# Patient Record
Sex: MALE | Race: WHITE
[De-identification: names, ages, dates, MRNs, and addresses within clinical notes are randomized per-mention and may not be internally consistent; named-entity substitution may affect disease eponyms.]

---

## 2019-08-13 ENCOUNTER — HOSPITAL ENCOUNTER (OUTPATIENT)
Dept: HOSPITAL 55 - SURG | Age: 84
Discharge: HOME | DRG: 125 | End: 2019-08-13
Attending: OPHTHALMOLOGY
Payer: MEDICARE

## 2019-08-13 VITALS — DIASTOLIC BLOOD PRESSURE: 97 MMHG | HEART RATE: 52 BPM | SYSTOLIC BLOOD PRESSURE: 168 MMHG

## 2019-08-13 VITALS — TEMPERATURE: 97.4 F | OXYGEN SATURATION: 95 % | OXYGEN SATURATION: 95 % | RESPIRATION RATE: 20 BRPM

## 2019-08-13 DIAGNOSIS — H25.89: Primary | ICD-10-CM

## 2019-08-13 DIAGNOSIS — E11.9: ICD-10-CM

## 2019-08-13 RX ADMIN — LIDOCAINE HYDROCHLORIDE ONE ML: 20 INJECTION, SOLUTION EPIDURAL; INFILTRATION; INTRACAUDAL; PERINEURAL at 14:25

## 2019-08-13 RX ADMIN — TETRACAINE HYDROCHLORIDE ONE DROP: 5 SOLUTION OPHTHALMIC at 14:25

## 2019-08-13 RX ADMIN — CYCLOPENTOLATE HYDROCHLORIDE ONE DROP: 10 SOLUTION OPHTHALMIC at 13:20

## 2019-08-13 RX ADMIN — PHENYLEPHRINE HYDROCHLORIDE ONE DROP: 100 SOLUTION/ DROPS OPHTHALMIC at 13:23

## 2019-08-13 RX ADMIN — LIDOCAINE HYDROCHLORIDE ONE ML: 20 INJECTION, SOLUTION EPIDURAL; INFILTRATION; INTRACAUDAL; PERINEURAL at 14:30

## 2019-08-13 RX ADMIN — TROPICAMIDE ONE DROP: 10 SOLUTION/ DROPS OPHTHALMIC at 13:23

## 2019-08-13 RX ADMIN — PHENYLEPHRINE HYDROCHLORIDE ONE DROP: 100 SOLUTION/ DROPS OPHTHALMIC at 13:17

## 2019-08-13 RX ADMIN — TROPICAMIDE ONE DROP: 10 SOLUTION/ DROPS OPHTHALMIC at 13:17

## 2019-08-13 RX ADMIN — TETRACAINE HYDROCHLORIDE ONE DROP: 5 SOLUTION OPHTHALMIC at 13:24

## 2019-08-13 RX ADMIN — TROPICAMIDE ONE DROP: 10 SOLUTION/ DROPS OPHTHALMIC at 13:20

## 2019-08-13 RX ADMIN — CYCLOPENTOLATE HYDROCHLORIDE ONE DROP: 10 SOLUTION OPHTHALMIC at 13:23

## 2019-08-13 RX ADMIN — PHENYLEPHRINE HYDROCHLORIDE ONE DROP: 100 SOLUTION/ DROPS OPHTHALMIC at 13:20

## 2019-08-13 RX ADMIN — CYCLOPENTOLATE HYDROCHLORIDE ONE DROP: 10 SOLUTION OPHTHALMIC at 13:17

## 2019-08-27 ENCOUNTER — HOSPITAL ENCOUNTER (OUTPATIENT)
Dept: HOSPITAL 55 - SURG | Age: 84
Discharge: HOME | DRG: 125 | End: 2019-08-27
Attending: OPHTHALMOLOGY
Payer: MEDICARE

## 2019-08-27 VITALS — TEMPERATURE: 97.6 F

## 2019-08-27 VITALS — RESPIRATION RATE: 16 BRPM | DIASTOLIC BLOOD PRESSURE: 81 MMHG | SYSTOLIC BLOOD PRESSURE: 155 MMHG | HEART RATE: 22 BPM

## 2019-08-27 DIAGNOSIS — E11.9: ICD-10-CM

## 2019-08-27 DIAGNOSIS — H25.89: Primary | ICD-10-CM

## 2019-08-27 RX ADMIN — TROPICAMIDE ONE DROP: 10 SOLUTION/ DROPS OPHTHALMIC at 10:47

## 2019-08-27 RX ADMIN — TETRACAINE HYDROCHLORIDE ONE DROP: 5 SOLUTION OPHTHALMIC at 12:08

## 2019-08-27 RX ADMIN — KETOROLAC TROMETHAMINE ONE DROP: 5 SOLUTION OPHTHALMIC at 10:55

## 2019-08-27 RX ADMIN — CYCLOPENTOLATE HYDROCHLORIDE ONE DROP: 10 SOLUTION OPHTHALMIC at 10:50

## 2019-08-27 RX ADMIN — PHENYLEPHRINE HYDROCHLORIDE ONE DROP: 100 SOLUTION/ DROPS OPHTHALMIC at 10:54

## 2019-08-27 RX ADMIN — TROPICAMIDE ONE DROP: 10 SOLUTION/ DROPS OPHTHALMIC at 10:55

## 2019-08-27 RX ADMIN — CYCLOPENTOLATE HYDROCHLORIDE ONE DROP: 10 SOLUTION OPHTHALMIC at 10:47

## 2019-08-27 RX ADMIN — PHENYLEPHRINE HYDROCHLORIDE ONE DROP: 100 SOLUTION/ DROPS OPHTHALMIC at 10:47

## 2019-08-27 RX ADMIN — TETRACAINE HYDROCHLORIDE ONE DROP: 5 SOLUTION OPHTHALMIC at 10:55

## 2019-08-27 RX ADMIN — PHENYLEPHRINE HYDROCHLORIDE ONE DROP: 100 SOLUTION/ DROPS OPHTHALMIC at 10:50

## 2019-08-27 RX ADMIN — KETOROLAC TROMETHAMINE ONE DROP: 5 SOLUTION OPHTHALMIC at 10:51

## 2019-08-27 RX ADMIN — CYCLOPENTOLATE HYDROCHLORIDE ONE DROP: 10 SOLUTION OPHTHALMIC at 10:54

## 2019-08-27 RX ADMIN — TROPICAMIDE ONE DROP: 10 SOLUTION/ DROPS OPHTHALMIC at 10:50

## 2021-04-24 ENCOUNTER — TRANSFERRED RECORDS (OUTPATIENT)
Dept: HEALTH INFORMATION MANAGEMENT | Facility: CLINIC | Age: 86
End: 2021-04-24

## 2021-04-24 ENCOUNTER — HOSPITAL ENCOUNTER (INPATIENT)
Facility: CLINIC | Age: 86
LOS: 8 days | Discharge: NURSING, REHAB OR LONG-TERM CARE | DRG: 064 | End: 2021-05-02
Attending: PSYCHIATRY & NEUROLOGY | Admitting: PSYCHIATRY & NEUROLOGY
Payer: MEDICARE

## 2021-04-24 DIAGNOSIS — I61.4 NONTRAUMATIC INTRACEREBRAL HEMORRHAGE OF CEREBELLUM, UNSPECIFIED LATERALITY (H): Primary | ICD-10-CM

## 2021-04-24 PROBLEM — I61.9 ICH (INTRACEREBRAL HEMORRHAGE) (H): Status: ACTIVE | Noted: 2021-04-24

## 2021-04-24 LAB
ANION GAP SERPL CALCULATED.3IONS-SCNC: 8 MMOL/L (ref 3–14)
BUN SERPL-MCNC: 18 MG/DL (ref 7–30)
CALCIUM SERPL-MCNC: 8.6 MG/DL (ref 8.5–10.1)
CHLORIDE SERPL-SCNC: 99 MMOL/L (ref 94–109)
CO2 SERPL-SCNC: 26 MMOL/L (ref 20–32)
CREAT SERPL-MCNC: 0.79 MG/DL (ref 0.66–1.25)
ERYTHROCYTE [DISTWIDTH] IN BLOOD BY AUTOMATED COUNT: 13.2 % (ref 10–15)
GFR SERPL CREATININE-BSD FRML MDRD: 82 ML/MIN/{1.73_M2}
GLUCOSE BLDC GLUCOMTR-MCNC: 261 MG/DL (ref 70–99)
GLUCOSE SERPL-MCNC: 260 MG/DL (ref 70–99)
HCT VFR BLD AUTO: 37.1 % (ref 40–53)
HGB BLD-MCNC: 12.4 G/DL (ref 13.3–17.7)
INR PPP: 1.03 (ref 0.86–1.14)
MCH RBC QN AUTO: 29.4 PG (ref 26.5–33)
MCHC RBC AUTO-ENTMCNC: 33.4 G/DL (ref 31.5–36.5)
MCV RBC AUTO: 88 FL (ref 78–100)
PLATELET # BLD AUTO: 249 10E9/L (ref 150–450)
POTASSIUM SERPL-SCNC: 3.5 MMOL/L (ref 3.4–5.3)
RBC # BLD AUTO: 4.22 10E12/L (ref 4.4–5.9)
SODIUM SERPL-SCNC: 133 MMOL/L (ref 133–144)
WBC # BLD AUTO: 14.8 10E9/L (ref 4–11)

## 2021-04-24 PROCEDURE — 250N000009 HC RX 250: Performed by: STUDENT IN AN ORGANIZED HEALTH CARE EDUCATION/TRAINING PROGRAM

## 2021-04-24 PROCEDURE — 85610 PROTHROMBIN TIME: CPT | Performed by: STUDENT IN AN ORGANIZED HEALTH CARE EDUCATION/TRAINING PROGRAM

## 2021-04-24 PROCEDURE — 999N001017 HC STATISTIC GLUCOSE BY METER IP

## 2021-04-24 PROCEDURE — 250N000011 HC RX IP 250 OP 636: Performed by: STUDENT IN AN ORGANIZED HEALTH CARE EDUCATION/TRAINING PROGRAM

## 2021-04-24 PROCEDURE — U0003 INFECTIOUS AGENT DETECTION BY NUCLEIC ACID (DNA OR RNA); SEVERE ACUTE RESPIRATORY SYNDROME CORONAVIRUS 2 (SARS-COV-2) (CORONAVIRUS DISEASE [COVID-19]), AMPLIFIED PROBE TECHNIQUE, MAKING USE OF HIGH THROUGHPUT TECHNOLOGIES AS DESCRIBED BY CMS-2020-01-R: HCPCS | Performed by: STUDENT IN AN ORGANIZED HEALTH CARE EDUCATION/TRAINING PROGRAM

## 2021-04-24 PROCEDURE — 93010 ELECTROCARDIOGRAM REPORT: CPT | Performed by: INTERNAL MEDICINE

## 2021-04-24 PROCEDURE — 80048 BASIC METABOLIC PNL TOTAL CA: CPT | Performed by: STUDENT IN AN ORGANIZED HEALTH CARE EDUCATION/TRAINING PROGRAM

## 2021-04-24 PROCEDURE — 36415 COLL VENOUS BLD VENIPUNCTURE: CPT | Performed by: STUDENT IN AN ORGANIZED HEALTH CARE EDUCATION/TRAINING PROGRAM

## 2021-04-24 PROCEDURE — 99291 CRITICAL CARE FIRST HOUR: CPT | Mod: GC | Performed by: PSYCHIATRY & NEUROLOGY

## 2021-04-24 PROCEDURE — U0005 INFEC AGEN DETEC AMPLI PROBE: HCPCS | Performed by: STUDENT IN AN ORGANIZED HEALTH CARE EDUCATION/TRAINING PROGRAM

## 2021-04-24 PROCEDURE — 250N000012 HC RX MED GY IP 250 OP 636 PS 637: Performed by: STUDENT IN AN ORGANIZED HEALTH CARE EDUCATION/TRAINING PROGRAM

## 2021-04-24 PROCEDURE — 83036 HEMOGLOBIN GLYCOSYLATED A1C: CPT | Performed by: STUDENT IN AN ORGANIZED HEALTH CARE EDUCATION/TRAINING PROGRAM

## 2021-04-24 PROCEDURE — 200N000002 HC R&B ICU UMMC

## 2021-04-24 PROCEDURE — 85027 COMPLETE CBC AUTOMATED: CPT | Performed by: STUDENT IN AN ORGANIZED HEALTH CARE EDUCATION/TRAINING PROGRAM

## 2021-04-24 PROCEDURE — 93005 ELECTROCARDIOGRAM TRACING: CPT

## 2021-04-24 RX ORDER — DEXTROSE MONOHYDRATE 25 G/50ML
25-50 INJECTION, SOLUTION INTRAVENOUS
Status: DISCONTINUED | OUTPATIENT
Start: 2021-04-24 | End: 2021-04-25

## 2021-04-24 RX ORDER — TRIAMCINOLONE ACETONIDE 55 UG/1
1 SPRAY, METERED NASAL DAILY
Status: ON HOLD | COMMUNITY
End: 2021-05-02

## 2021-04-24 RX ORDER — IBUPROFEN 200 MG
400 TABLET ORAL EVERY 4 HOURS PRN
Status: ON HOLD | COMMUNITY
End: 2021-05-02

## 2021-04-24 RX ORDER — HYDRALAZINE HYDROCHLORIDE 20 MG/ML
10-20 INJECTION INTRAMUSCULAR; INTRAVENOUS
Status: DISCONTINUED | OUTPATIENT
Start: 2021-04-24 | End: 2021-04-30

## 2021-04-24 RX ORDER — OMEPRAZOLE 40 MG/1
40 CAPSULE, DELAYED RELEASE ORAL DAILY
Status: ON HOLD | COMMUNITY
Start: 2020-12-16 | End: 2021-05-02

## 2021-04-24 RX ORDER — LABETALOL HYDROCHLORIDE 5 MG/ML
10 INJECTION, SOLUTION INTRAVENOUS EVERY 10 MIN PRN
Status: DISCONTINUED | OUTPATIENT
Start: 2021-04-24 | End: 2021-04-28

## 2021-04-24 RX ORDER — ASCORBIC ACID 500 MG
500 TABLET ORAL DAILY
Status: ON HOLD | COMMUNITY
End: 2021-05-02

## 2021-04-24 RX ORDER — FINASTERIDE 5 MG/1
5 TABLET, FILM COATED ORAL DAILY
Status: ON HOLD | COMMUNITY
Start: 2020-12-16 | End: 2021-05-02

## 2021-04-24 RX ORDER — LOSARTAN POTASSIUM 25 MG/1
25 TABLET ORAL 2 TIMES DAILY
Status: ON HOLD | COMMUNITY
Start: 2020-12-16 | End: 2021-05-02

## 2021-04-24 RX ORDER — PANTOPRAZOLE SODIUM 40 MG/1
40 TABLET, DELAYED RELEASE ORAL DAILY
Status: ON HOLD | COMMUNITY
Start: 2021-04-12 | End: 2021-05-02

## 2021-04-24 RX ORDER — FAMOTIDINE 40 MG/5ML
20 POWDER, FOR SUSPENSION ORAL 2 TIMES DAILY
Status: DISCONTINUED | OUTPATIENT
Start: 2021-04-24 | End: 2021-04-24

## 2021-04-24 RX ORDER — ALBUTEROL SULFATE 90 UG/1
2 AEROSOL, METERED RESPIRATORY (INHALATION) EVERY 4 HOURS PRN
Status: ON HOLD | COMMUNITY
Start: 2021-03-23 | End: 2021-05-02

## 2021-04-24 RX ORDER — GLIMEPIRIDE 1 MG/1
1 TABLET ORAL 2 TIMES DAILY WITH MEALS
Status: ON HOLD | COMMUNITY
End: 2021-05-02

## 2021-04-24 RX ORDER — POLYETHYLENE GLYCOL 3350 17 G/17G
17 POWDER, FOR SOLUTION ORAL DAILY
Status: ON HOLD | COMMUNITY
End: 2021-05-02

## 2021-04-24 RX ORDER — NICOTINE POLACRILEX 4 MG
15-30 LOZENGE BUCCAL
Status: DISCONTINUED | OUTPATIENT
Start: 2021-04-24 | End: 2021-04-25

## 2021-04-24 RX ORDER — DOCUSATE SODIUM 100 MG/1
200 CAPSULE, LIQUID FILLED ORAL DAILY
Status: ON HOLD | COMMUNITY
End: 2021-04-30

## 2021-04-24 RX ORDER — FAMOTIDINE 20 MG/1
20 TABLET, FILM COATED ORAL 2 TIMES DAILY
Status: DISCONTINUED | OUTPATIENT
Start: 2021-04-24 | End: 2021-04-24

## 2021-04-24 RX ORDER — AZELASTINE 1 MG/ML
1 SPRAY, METERED NASAL 2 TIMES DAILY
Status: ON HOLD | COMMUNITY
Start: 2021-04-22 | End: 2021-05-02

## 2021-04-24 RX ORDER — MULTIVITAMIN,THERAPEUTIC
1 TABLET ORAL DAILY
Status: ON HOLD | COMMUNITY
End: 2021-05-02

## 2021-04-24 RX ORDER — PRAVASTATIN SODIUM 20 MG
20 TABLET ORAL DAILY
Status: ON HOLD | COMMUNITY
Start: 2021-04-12 | End: 2021-05-02

## 2021-04-24 RX ORDER — AMLODIPINE BESYLATE 5 MG/1
5 TABLET ORAL DAILY
Status: ON HOLD | COMMUNITY
Start: 2020-12-16 | End: 2021-05-02

## 2021-04-24 RX ORDER — TERAZOSIN 10 MG/1
10 CAPSULE ORAL AT BEDTIME
Status: ON HOLD | COMMUNITY
Start: 2020-12-16 | End: 2021-05-02

## 2021-04-24 RX ADMIN — INSULIN ASPART 2 UNITS: 100 INJECTION, SOLUTION INTRAVENOUS; SUBCUTANEOUS at 22:21

## 2021-04-24 RX ADMIN — LABETALOL HYDROCHLORIDE 10 MG: 5 INJECTION, SOLUTION INTRAVENOUS at 21:55

## 2021-04-24 RX ADMIN — LABETALOL HYDROCHLORIDE 10 MG: 5 INJECTION, SOLUTION INTRAVENOUS at 21:20

## 2021-04-24 RX ADMIN — NICARDIPINE HYDROCHLORIDE 10 MG/HR: 0.2 INJECTION, SOLUTION INTRAVENOUS at 21:55

## 2021-04-24 ASSESSMENT — MIFFLIN-ST. JEOR: SCORE: 1385.5

## 2021-04-24 NOTE — LETTER
"Transition Communication Hand-off for Care Transitions to Next Level of Care Provider    Name: Ronen Small  : 1935  MRN #: [id number]  Primary Care Provider: GRACIA GOMEZ     Primary Clinic: [facility] MED  [address]   [place] [address]     Reason for Hospitalization:  ICH (intracerebral hemorrhage) (H) [I61.9]  Admit Date/Time: 2021  8:52 PM  Discharge Date:   Payor Source: Payor: MEDICARE / Plan: MEDICARE / Product Type: Medicare /   Care Management Discharge Note     Discharge Date: 21at 10:45am     Discharge Disposition: TCU  [facility]  [address] [place] [address]  F:[phone]  P: [phone]     **FOR RN TO RN REPORT, call [phone]**     Discharge Services:  TCU                Discharge DME:   None     Discharge Transportation: car, drives self     Private pay costs discussed: Not applicable     PAS Confirmation Code:  [id number]  Patient/family educated on Medicare website which has current facility and service quality ratings: yes     Education Provided on the Discharge Plan:   Yes  Persons Notified of Discharge Plans: Attending Provider, charge RN, bedside RN (who will inform pt), pt's wife Tracey on the phone, pt's son Negrito on the phone, and Bhargavi (RN) at [facility].   Patient/Family in Agreement with the Plan:  Yes     Handoff Referral Completed: {YES / NO:[id number]::\"Yes\"}     Additional Information:  OJSE informed by MD that pt is ready to discharge. JOSE spoke with Bhargavi at [facility] who states they can accept pt today if he leaves the hospital by 11am. JOSE received orders and faxed orders/discharge summary at 10:00am. JOSE updated charge RN and bedside RN, provided them with RN to RN report, requested they call that in before pt discharges. JOSE updated spouse on the phone (Tracey), who confirms her son Negrito is on his way to the hospital. JOSE spoke with son Negrito, confirmed he can pull into ED parking lot and RN will bring " pt down at 10:45. SW provided Negrito with RN's phone number and vice versa.      No other SW needs anticipated at this time.                  Kay Basurto, [facility]  Weekend Adult Acute Care      For weekend social work needs, contact information below and available on [facility]:  4A, 4C, 4E, 5A, 5B                  pager [phone]  6A, 6B, 6C                               pager [phone]  7A, 7B, 7C, 7D, 5C                 pager [phone]  ED/OBS                                  pager [phone]     For weekend RN care coordinator needs (home discharge with needs including home care, rides home, assisted living facility returns, durable medical equip, IV antibiotics) - page [phone].     For hours 1600 - midnight Pager: [phone]

## 2021-04-24 NOTE — LETTER
Health Information Management Services               Recipient:    [facility]      Sender:    NURYS Goodman, [facility]  ICU    [facility]  Phone: [phone]  Pager: [phone]      Date: April 28, 2021  Patient Name:  Ronen Small  Routing Message:            The documents accompanying this notice contain confidential information belonging to the sender.  This information is intended only for the use of the individual or entity named above.  The authorized recipient of this information is prohibited from disclosing this information to any other party and is required to destroy the information after its stated need has been fulfilled, unless otherwise required by state law.      If you are not the intended recipient, you are hereby notified that any disclosure, copy, distribution or action taken in reliance on the contents of these documents is strictly prohibited.  If you have received this document in error, please return it by fax to [phone] with a note on the cover sheet explaining why you are returning it (e.g. not your patient, not your provider, etc.).  If you need further assistance, please call  [facility] Centralized Transcription at [phone].  Documents may also be returned by mail to [employer] , [facility] [address], [place], [place] [address].

## 2021-04-24 NOTE — LETTER
Transition Communication Hand-off for Care Transitions to Next Level of Care Provider    Name: Ronen Small  : 1935  MRN #: [id number]  Primary Care Provider: GRACIA GOMEZ     [facility]: [facility]  [address] [facility] [address]     Reason for Hospitalization:  ICH (intracerebral hemorrhage) (H) [I61.9]  Admit Date/Time: 2021  8:52 PM  Discharge Date: 2021  Payor Source: Payor: MEDICARE / Plan: MEDICARE / Product Type: Medicare /              Reason for Communication Hand-off Referral:    Notification of the discharge plan    Discharge Plan:    Care Management Discharge Note     Discharge Date: 21        Discharge Disposition:  [facility]  [address] [place] [address]  F: [phone]  [phone]     Discharge Services:  Short term TCU placement at [facility]     Discharge DME:  Not applicable     Discharge Transportation: JOSE spoke with pt's floor nurse (Bertha) who states that pt would be appropriate to be transported by w/c via non emergency medical transport company or by car with family.  JOSE spoke with pt's wife who states that she and pt's son (George and or En) will provide pt's transport with plan to pick pt up at 11am ([facility] has requested that pt arrive no later than 1:30pm).       Private pay costs discussed:  Not applicable     PAS Confirmation Code:   [id number].  Patient/family educated on Medicare website which has current facility and service quality ratings: yes     Education Provided on the Discharge Plan:  yes  Persons Notified of Discharge Plans: pt, wife, 6A nursing and Dr. Bess  Patient/Family in Agreement with the Plan:  Yes     Handoff Referral Completed: Yes     Additional Information:  - Dr. Bess has confirmed that pt will be ready for discharge on 2021  - Admissions (Kely) at [facility] has confirmed acceptance for admit  - IMM completed.       CARMEN WestW  Social Work,  6A  Phone:  [phone]  Pager:  [phone]  4/30/2021

## 2021-04-25 ENCOUNTER — APPOINTMENT (OUTPATIENT)
Dept: CT IMAGING | Facility: CLINIC | Age: 86
DRG: 064 | End: 2021-04-25
Attending: NURSE PRACTITIONER
Payer: MEDICARE

## 2021-04-25 ENCOUNTER — APPOINTMENT (OUTPATIENT)
Dept: CT IMAGING | Facility: CLINIC | Age: 86
DRG: 064 | End: 2021-04-25
Attending: STUDENT IN AN ORGANIZED HEALTH CARE EDUCATION/TRAINING PROGRAM
Payer: MEDICARE

## 2021-04-25 ENCOUNTER — APPOINTMENT (OUTPATIENT)
Dept: CARDIOLOGY | Facility: CLINIC | Age: 86
DRG: 064 | End: 2021-04-25
Attending: PSYCHIATRY & NEUROLOGY
Payer: MEDICARE

## 2021-04-25 LAB
ANION GAP SERPL CALCULATED.3IONS-SCNC: 7 MMOL/L (ref 3–14)
BUN SERPL-MCNC: 19 MG/DL (ref 7–30)
CALCIUM SERPL-MCNC: 8.5 MG/DL (ref 8.5–10.1)
CHLORIDE SERPL-SCNC: 98 MMOL/L (ref 94–109)
CO2 SERPL-SCNC: 27 MMOL/L (ref 20–32)
CREAT SERPL-MCNC: 0.75 MG/DL (ref 0.66–1.25)
ERYTHROCYTE [DISTWIDTH] IN BLOOD BY AUTOMATED COUNT: 12.9 % (ref 10–15)
GFR SERPL CREATININE-BSD FRML MDRD: 84 ML/MIN/{1.73_M2}
GLUCOSE BLDC GLUCOMTR-MCNC: 172 MG/DL (ref 70–99)
GLUCOSE BLDC GLUCOMTR-MCNC: 189 MG/DL (ref 70–99)
GLUCOSE BLDC GLUCOMTR-MCNC: 195 MG/DL (ref 70–99)
GLUCOSE BLDC GLUCOMTR-MCNC: 214 MG/DL (ref 70–99)
GLUCOSE BLDC GLUCOMTR-MCNC: 250 MG/DL (ref 70–99)
GLUCOSE SERPL-MCNC: 218 MG/DL (ref 70–99)
HBA1C MFR BLD: 7.3 % (ref 0–5.6)
HCT VFR BLD AUTO: 34.9 % (ref 40–53)
HGB BLD-MCNC: 11.6 G/DL (ref 13.3–17.7)
LABORATORY COMMENT REPORT: NORMAL
MAGNESIUM SERPL-MCNC: 2 MG/DL (ref 1.6–2.3)
MCH RBC QN AUTO: 28.8 PG (ref 26.5–33)
MCHC RBC AUTO-ENTMCNC: 33.2 G/DL (ref 31.5–36.5)
MCV RBC AUTO: 87 FL (ref 78–100)
PHOSPHATE SERPL-MCNC: 2.5 MG/DL (ref 2.5–4.5)
PLATELET # BLD AUTO: 258 10E9/L (ref 150–450)
POTASSIUM SERPL-SCNC: 3.3 MMOL/L (ref 3.4–5.3)
POTASSIUM SERPL-SCNC: 3.4 MMOL/L (ref 3.4–5.3)
RBC # BLD AUTO: 4.03 10E12/L (ref 4.4–5.9)
SARS-COV-2 RNA RESP QL NAA+PROBE: NEGATIVE
SODIUM SERPL-SCNC: 133 MMOL/L (ref 133–144)
SPECIMEN SOURCE: NORMAL
WBC # BLD AUTO: 10.2 10E9/L (ref 4–11)

## 2021-04-25 PROCEDURE — 84132 ASSAY OF SERUM POTASSIUM: CPT | Performed by: STUDENT IN AN ORGANIZED HEALTH CARE EDUCATION/TRAINING PROGRAM

## 2021-04-25 PROCEDURE — 70450 CT HEAD/BRAIN W/O DYE: CPT | Mod: 76

## 2021-04-25 PROCEDURE — 255N000002 HC RX 255 OP 636: Performed by: STUDENT IN AN ORGANIZED HEALTH CARE EDUCATION/TRAINING PROGRAM

## 2021-04-25 PROCEDURE — 36415 COLL VENOUS BLD VENIPUNCTURE: CPT | Performed by: STUDENT IN AN ORGANIZED HEALTH CARE EDUCATION/TRAINING PROGRAM

## 2021-04-25 PROCEDURE — 999N000157 HC STATISTIC RCP TIME EA 10 MIN

## 2021-04-25 PROCEDURE — 250N000011 HC RX IP 250 OP 636: Performed by: PSYCHIATRY & NEUROLOGY

## 2021-04-25 PROCEDURE — 80048 BASIC METABOLIC PNL TOTAL CA: CPT | Performed by: STUDENT IN AN ORGANIZED HEALTH CARE EDUCATION/TRAINING PROGRAM

## 2021-04-25 PROCEDURE — 70450 CT HEAD/BRAIN W/O DYE: CPT | Mod: MG,77

## 2021-04-25 PROCEDURE — G1004 CDSM NDSC: HCPCS | Mod: GC | Performed by: RADIOLOGY

## 2021-04-25 PROCEDURE — 70498 CT ANGIOGRAPHY NECK: CPT

## 2021-04-25 PROCEDURE — 70450 CT HEAD/BRAIN W/O DYE: CPT | Mod: 26 | Performed by: RADIOLOGY

## 2021-04-25 PROCEDURE — 250N000009 HC RX 250: Performed by: STUDENT IN AN ORGANIZED HEALTH CARE EDUCATION/TRAINING PROGRAM

## 2021-04-25 PROCEDURE — 999N000015 HC STATISTIC ARTERIAL MONITORING DAILY

## 2021-04-25 PROCEDURE — 83735 ASSAY OF MAGNESIUM: CPT | Performed by: STUDENT IN AN ORGANIZED HEALTH CARE EDUCATION/TRAINING PROGRAM

## 2021-04-25 PROCEDURE — 99207 CT HEAD W/O CONTRAST: CPT | Mod: 26 | Performed by: RADIOLOGY

## 2021-04-25 PROCEDURE — 250N000011 HC RX IP 250 OP 636: Performed by: STUDENT IN AN ORGANIZED HEALTH CARE EDUCATION/TRAINING PROGRAM

## 2021-04-25 PROCEDURE — 93306 TTE W/DOPPLER COMPLETE: CPT | Mod: 26 | Performed by: STUDENT IN AN ORGANIZED HEALTH CARE EDUCATION/TRAINING PROGRAM

## 2021-04-25 PROCEDURE — 85027 COMPLETE CBC AUTOMATED: CPT | Performed by: STUDENT IN AN ORGANIZED HEALTH CARE EDUCATION/TRAINING PROGRAM

## 2021-04-25 PROCEDURE — 250N000013 HC RX MED GY IP 250 OP 250 PS 637: Performed by: STUDENT IN AN ORGANIZED HEALTH CARE EDUCATION/TRAINING PROGRAM

## 2021-04-25 PROCEDURE — 999N001017 HC STATISTIC GLUCOSE BY METER IP

## 2021-04-25 PROCEDURE — 84100 ASSAY OF PHOSPHORUS: CPT | Performed by: STUDENT IN AN ORGANIZED HEALTH CARE EDUCATION/TRAINING PROGRAM

## 2021-04-25 PROCEDURE — 70496 CT ANGIOGRAPHY HEAD: CPT | Mod: 26 | Performed by: RADIOLOGY

## 2021-04-25 PROCEDURE — 999N000208 ECHOCARDIOGRAM COMPLETE

## 2021-04-25 PROCEDURE — 36620 INSERTION CATHETER ARTERY: CPT | Performed by: PSYCHIATRY & NEUROLOGY

## 2021-04-25 PROCEDURE — 70498 CT ANGIOGRAPHY NECK: CPT | Mod: 26 | Performed by: RADIOLOGY

## 2021-04-25 PROCEDURE — 200N000002 HC R&B ICU UMMC

## 2021-04-25 PROCEDURE — 250N000009 HC RX 250: Performed by: PSYCHIATRY & NEUROLOGY

## 2021-04-25 PROCEDURE — 250N000013 HC RX MED GY IP 250 OP 250 PS 637: Performed by: PSYCHIATRY & NEUROLOGY

## 2021-04-25 PROCEDURE — 70450 CT HEAD/BRAIN W/O DYE: CPT

## 2021-04-25 RX ORDER — PANTOPRAZOLE SODIUM 40 MG/1
40 TABLET, DELAYED RELEASE ORAL
Status: DISCONTINUED | OUTPATIENT
Start: 2021-04-25 | End: 2021-04-30 | Stop reason: ALTCHOICE

## 2021-04-25 RX ORDER — MAGNESIUM OXIDE 400 MG/1
400 TABLET ORAL 2 TIMES DAILY
Status: DISCONTINUED | OUTPATIENT
Start: 2021-04-25 | End: 2021-04-26 | Stop reason: DRUGHIGH

## 2021-04-25 RX ORDER — AMOXICILLIN 250 MG
1 CAPSULE ORAL 2 TIMES DAILY
Status: DISCONTINUED | OUTPATIENT
Start: 2021-04-25 | End: 2021-04-25

## 2021-04-25 RX ORDER — LOSARTAN POTASSIUM 25 MG/1
25 TABLET ORAL 2 TIMES DAILY
Status: DISCONTINUED | OUTPATIENT
Start: 2021-04-25 | End: 2021-04-27

## 2021-04-25 RX ORDER — NALOXONE HYDROCHLORIDE 0.4 MG/ML
0.4 INJECTION, SOLUTION INTRAMUSCULAR; INTRAVENOUS; SUBCUTANEOUS
Status: DISCONTINUED | OUTPATIENT
Start: 2021-04-25 | End: 2021-04-26

## 2021-04-25 RX ORDER — FENTANYL CITRATE 50 UG/ML
25-50 INJECTION, SOLUTION INTRAMUSCULAR; INTRAVENOUS EVERY 5 MIN PRN
Status: DISCONTINUED | OUTPATIENT
Start: 2021-04-25 | End: 2021-04-26 | Stop reason: HOSPADM

## 2021-04-25 RX ORDER — FENTANYL CITRATE 50 UG/ML
25-50 INJECTION, SOLUTION INTRAMUSCULAR; INTRAVENOUS EVERY 5 MIN PRN
Status: CANCELLED | OUTPATIENT
Start: 2021-04-25

## 2021-04-25 RX ORDER — AMOXICILLIN 250 MG
1 CAPSULE ORAL 2 TIMES DAILY
Status: DISCONTINUED | OUTPATIENT
Start: 2021-04-25 | End: 2021-04-27

## 2021-04-25 RX ORDER — NALOXONE HYDROCHLORIDE 0.4 MG/ML
0.4 INJECTION, SOLUTION INTRAMUSCULAR; INTRAVENOUS; SUBCUTANEOUS
Status: CANCELLED | OUTPATIENT
Start: 2021-04-25

## 2021-04-25 RX ORDER — NALOXONE HYDROCHLORIDE 0.4 MG/ML
0.2 INJECTION, SOLUTION INTRAMUSCULAR; INTRAVENOUS; SUBCUTANEOUS
Status: CANCELLED | OUTPATIENT
Start: 2021-04-25

## 2021-04-25 RX ORDER — HEPARIN SODIUM 200 [USP'U]/100ML
1 INJECTION, SOLUTION INTRAVENOUS CONTINUOUS PRN
Status: CANCELLED | OUTPATIENT
Start: 2021-04-25

## 2021-04-25 RX ORDER — HEPARIN SODIUM 200 [USP'U]/100ML
1 INJECTION, SOLUTION INTRAVENOUS CONTINUOUS PRN
Status: DISCONTINUED | OUTPATIENT
Start: 2021-04-25 | End: 2021-04-26 | Stop reason: HOSPADM

## 2021-04-25 RX ORDER — POLYETHYLENE GLYCOL 3350 17 G/17G
17 POWDER, FOR SOLUTION ORAL DAILY
Status: DISCONTINUED | OUTPATIENT
Start: 2021-04-25 | End: 2021-04-25

## 2021-04-25 RX ORDER — DEXTROSE MONOHYDRATE 25 G/50ML
25-50 INJECTION, SOLUTION INTRAVENOUS
Status: DISCONTINUED | OUTPATIENT
Start: 2021-04-25 | End: 2021-04-27

## 2021-04-25 RX ORDER — NALOXONE HYDROCHLORIDE 0.4 MG/ML
0.2 INJECTION, SOLUTION INTRAMUSCULAR; INTRAVENOUS; SUBCUTANEOUS
Status: DISCONTINUED | OUTPATIENT
Start: 2021-04-25 | End: 2021-04-26

## 2021-04-25 RX ORDER — POTASSIUM CHLORIDE 750 MG/1
40 TABLET, EXTENDED RELEASE ORAL ONCE
Status: COMPLETED | OUTPATIENT
Start: 2021-04-25 | End: 2021-04-25

## 2021-04-25 RX ORDER — NIMODIPINE 30 MG/1
60 CAPSULE, LIQUID FILLED ORAL
Status: DISCONTINUED | OUTPATIENT
Start: 2021-04-25 | End: 2021-04-27

## 2021-04-25 RX ORDER — FINASTERIDE 5 MG/1
5 TABLET, FILM COATED ORAL DAILY
Status: DISCONTINUED | OUTPATIENT
Start: 2021-04-25 | End: 2021-05-02 | Stop reason: HOSPADM

## 2021-04-25 RX ORDER — POLYETHYLENE GLYCOL 3350 17 G/17G
17 POWDER, FOR SOLUTION ORAL DAILY
Status: DISCONTINUED | OUTPATIENT
Start: 2021-04-25 | End: 2021-05-02 | Stop reason: HOSPADM

## 2021-04-25 RX ORDER — NALOXONE HYDROCHLORIDE 0.4 MG/ML
0.4 INJECTION, SOLUTION INTRAMUSCULAR; INTRAVENOUS; SUBCUTANEOUS
Status: DISCONTINUED | OUTPATIENT
Start: 2021-04-25 | End: 2021-04-26 | Stop reason: HOSPADM

## 2021-04-25 RX ORDER — LEVETIRACETAM 5 MG/ML
500 INJECTION INTRAVASCULAR 2 TIMES DAILY
Status: DISCONTINUED | OUTPATIENT
Start: 2021-04-25 | End: 2021-04-26

## 2021-04-25 RX ORDER — MAGNESIUM HYDROXIDE 1200 MG/15ML
10 LIQUID ORAL
Status: DISCONTINUED | OUTPATIENT
Start: 2021-04-25 | End: 2021-04-25

## 2021-04-25 RX ORDER — SODIUM CHLORIDE, SODIUM GLUCONATE, SODIUM ACETATE, POTASSIUM CHLORIDE AND MAGNESIUM CHLORIDE 526; 502; 368; 37; 30 MG/100ML; MG/100ML; MG/100ML; MG/100ML; MG/100ML
100 INJECTION, SOLUTION INTRAVENOUS CONTINUOUS
Status: DISCONTINUED | OUTPATIENT
Start: 2021-04-25 | End: 2021-04-26

## 2021-04-25 RX ORDER — ACETAMINOPHEN 325 MG/1
650 TABLET ORAL EVERY 4 HOURS PRN
Status: DISCONTINUED | OUTPATIENT
Start: 2021-04-25 | End: 2021-05-02 | Stop reason: HOSPADM

## 2021-04-25 RX ORDER — NALOXONE HYDROCHLORIDE 0.4 MG/ML
0.2 INJECTION, SOLUTION INTRAMUSCULAR; INTRAVENOUS; SUBCUTANEOUS
Status: DISCONTINUED | OUTPATIENT
Start: 2021-04-25 | End: 2021-04-26 | Stop reason: HOSPADM

## 2021-04-25 RX ORDER — IOPAMIDOL 755 MG/ML
75 INJECTION, SOLUTION INTRAVASCULAR ONCE
Status: COMPLETED | OUTPATIENT
Start: 2021-04-25 | End: 2021-04-25

## 2021-04-25 RX ORDER — FLUMAZENIL 0.1 MG/ML
0.2 INJECTION, SOLUTION INTRAVENOUS
Status: CANCELLED | OUTPATIENT
Start: 2021-04-25

## 2021-04-25 RX ORDER — AMLODIPINE BESYLATE 5 MG/1
5 TABLET ORAL DAILY
Status: DISCONTINUED | OUTPATIENT
Start: 2021-04-25 | End: 2021-04-27

## 2021-04-25 RX ORDER — NICOTINE POLACRILEX 4 MG
15-30 LOZENGE BUCCAL
Status: DISCONTINUED | OUTPATIENT
Start: 2021-04-25 | End: 2021-04-27

## 2021-04-25 RX ORDER — PANTOPRAZOLE SODIUM 40 MG/1
40 TABLET, DELAYED RELEASE ORAL
Status: DISCONTINUED | OUTPATIENT
Start: 2021-04-25 | End: 2021-04-25

## 2021-04-25 RX ORDER — FLUMAZENIL 0.1 MG/ML
0.2 INJECTION, SOLUTION INTRAVENOUS
Status: DISCONTINUED | OUTPATIENT
Start: 2021-04-25 | End: 2021-04-26 | Stop reason: HOSPADM

## 2021-04-25 RX ADMIN — MAGNESIUM OXIDE 400 MG: 400 TABLET ORAL at 20:05

## 2021-04-25 RX ADMIN — LABETALOL HYDROCHLORIDE 10 MG: 5 INJECTION, SOLUTION INTRAVENOUS at 07:01

## 2021-04-25 RX ADMIN — NIMODIPINE 60 MG: 30 CAPSULE, LIQUID FILLED ORAL at 14:21

## 2021-04-25 RX ADMIN — FINASTERIDE 5 MG: 5 TABLET, FILM COATED ORAL at 10:21

## 2021-04-25 RX ADMIN — LOSARTAN POTASSIUM 25 MG: 25 TABLET, FILM COATED ORAL at 10:21

## 2021-04-25 RX ADMIN — PANTOPRAZOLE SODIUM 40 MG: 40 TABLET, DELAYED RELEASE ORAL at 14:22

## 2021-04-25 RX ADMIN — NIMODIPINE 60 MG: 30 CAPSULE, LIQUID FILLED ORAL at 20:05

## 2021-04-25 RX ADMIN — SODIUM CHLORIDE, SODIUM GLUCONATE, SODIUM ACETATE, POTASSIUM CHLORIDE AND MAGNESIUM CHLORIDE 100 ML/HR: 526; 502; 368; 37; 30 INJECTION, SOLUTION INTRAVENOUS at 20:38

## 2021-04-25 RX ADMIN — NICARDIPINE HYDROCHLORIDE 7.5 MG/HR: 0.2 INJECTION, SOLUTION INTRAVENOUS at 14:23

## 2021-04-25 RX ADMIN — INSULIN ASPART 2 UNITS: 100 INJECTION, SOLUTION INTRAVENOUS; SUBCUTANEOUS at 03:07

## 2021-04-25 RX ADMIN — AMLODIPINE BESYLATE 5 MG: 5 TABLET ORAL at 10:20

## 2021-04-25 RX ADMIN — HUMAN ALBUMIN MICROSPHERES AND PERFLUTREN 5 ML: 10; .22 INJECTION, SOLUTION INTRAVENOUS at 10:07

## 2021-04-25 RX ADMIN — POTASSIUM CHLORIDE 40 MEQ: 750 TABLET, EXTENDED RELEASE ORAL at 14:26

## 2021-04-25 RX ADMIN — LOSARTAN POTASSIUM 25 MG: 25 TABLET, FILM COATED ORAL at 20:05

## 2021-04-25 RX ADMIN — POTASSIUM & SODIUM PHOSPHATES POWDER PACK 280-160-250 MG 1 PACKET: 280-160-250 PACK at 20:05

## 2021-04-25 RX ADMIN — LEVETIRACETAM 500 MG: 5 INJECTION INTRAVENOUS at 13:41

## 2021-04-25 RX ADMIN — PROCHLORPERAZINE EDISYLATE 5 MG: 5 INJECTION INTRAMUSCULAR; INTRAVENOUS at 07:01

## 2021-04-25 RX ADMIN — SODIUM CHLORIDE, SODIUM GLUCONATE, SODIUM ACETATE, POTASSIUM CHLORIDE AND MAGNESIUM CHLORIDE 100 ML/HR: 526; 502; 368; 37; 30 INJECTION, SOLUTION INTRAVENOUS at 10:28

## 2021-04-25 RX ADMIN — NICARDIPINE HYDROCHLORIDE 7.5 MG/HR: 0.2 INJECTION, SOLUTION INTRAVENOUS at 09:09

## 2021-04-25 RX ADMIN — IOPAMIDOL 75 ML: 755 INJECTION, SOLUTION INTRAVENOUS at 09:43

## 2021-04-25 RX ADMIN — NICARDIPINE HYDROCHLORIDE 10 MG/HR: 0.2 INJECTION, SOLUTION INTRAVENOUS at 18:50

## 2021-04-25 RX ADMIN — NICARDIPINE HYDROCHLORIDE 5 MG/HR: 0.2 INJECTION, SOLUTION INTRAVENOUS at 23:00

## 2021-04-25 RX ADMIN — LABETALOL HYDROCHLORIDE 10 MG: 5 INJECTION, SOLUTION INTRAVENOUS at 03:25

## 2021-04-25 RX ADMIN — LABETALOL HYDROCHLORIDE 10 MG: 5 INJECTION, SOLUTION INTRAVENOUS at 19:01

## 2021-04-25 RX ADMIN — NICARDIPINE HYDROCHLORIDE 9 MG/HR: 0.2 INJECTION, SOLUTION INTRAVENOUS at 03:23

## 2021-04-25 RX ADMIN — POLYETHYLENE GLYCOL 3350 17 G: 17 POWDER, FOR SOLUTION ORAL at 16:04

## 2021-04-25 RX ADMIN — LABETALOL HYDROCHLORIDE 10 MG: 5 INJECTION, SOLUTION INTRAVENOUS at 11:37

## 2021-04-25 RX ADMIN — INSULIN ASPART 1 UNITS: 100 INJECTION, SOLUTION INTRAVENOUS; SUBCUTANEOUS at 12:10

## 2021-04-25 RX ADMIN — INSULIN ASPART 1 UNITS: 100 INJECTION, SOLUTION INTRAVENOUS; SUBCUTANEOUS at 08:08

## 2021-04-25 RX ADMIN — DOCUSATE SODIUM 50 MG AND SENNOSIDES 8.6 MG 1 TABLET: 8.6; 5 TABLET, FILM COATED ORAL at 20:05

## 2021-04-25 RX ADMIN — LEVETIRACETAM 500 MG: 5 INJECTION INTRAVENOUS at 22:05

## 2021-04-25 ASSESSMENT — VISUAL ACUITY
OU: BLURRED VISION;BASELINE;DOUBLE VISION/DIPLOPIA
OU: BLURRED VISION;BASELINE
OU: BLURRED VISION;BASELINE;DOUBLE VISION/DIPLOPIA
OU: BLURRED VISION;BASELINE
OU: BLURRED VISION;BASELINE;DOUBLE VISION/DIPLOPIA

## 2021-04-25 ASSESSMENT — ACTIVITIES OF DAILY LIVING (ADL)
ADLS_ACUITY_SCORE: 16

## 2021-04-25 NOTE — PLAN OF CARE
OT 4AB: Cancel - OT consult received. Pt having procedure this AM and planned to complete PT evaluation this afternoon. Will reschedule and assess on 4/26.

## 2021-04-25 NOTE — H&P
Neuroscience Intensive Care H&P        HPI    - Ronen Small is a 85 year old year old with PMH of HTN, KAPIL and DM who was admitted for ICH.    - Patient states that around 5 pm today, when he was playing cards, suddenly he felt that something is wrong with his head. Started being dizzy and nauseous. Was taken to [facility], where he was found to have /96. Stroke code called on him at 1911. NIHSS  1 for mild dysartia. Head CT done with cerebellar hemorrhage. Was started on Nicardpine gtt for BP control ans transferred to [facility] ICU for ICU care.    - Patient report that he has been complaint with his BP medication. He doesn't measure his blood pressure at home but he was under the impression that his blood pressure has always been control.    - Denies, numbness, weakness, confusion and vision changes. Feels dizzy and nauseous.    Past Medical/Surgery History:     KAPIL  HTN  DM  GERD    Family History: No family history on file.    Social History:   Social History     Tobacco Use     Smoking status: Not on file   Substance Use Topics     Alcohol use: Not on file       ROS:  ROS: 10 point ROS neg other than the symptoms noted above in the HPI.    Vital Signs:  Vitals:    04/24/21 2100 04/24/21 2115 04/24/21 2130   BP: (!) 154/63 (!) 158/70 (!) 143/70   Pulse: 89 92 73   SpO2: 92% 95% 90%         Physical Examination:  Physical Examination   Vitals: /69   Pulse 75   Temp 97.7  F (36.5  C) (Oral)   Resp 16   Wt 72.1 kg (158 lb 15.2 oz)   SpO2 93%   General: Adult male patient, lying in bed, NAD  HEENT: NC/AT, no icterus, op pink and moist  Cardiac: RRR  Chest: non-labored on RA  Abdomen: ND  Extremities: Warm, no edema  Skin: No rash or lesion   Psych: Mood pleasant, affect congruent  Neuro:  Mental status: Awake, alert, attentive, oriented to self, time, place, and circumstance. Language is fluent and coherent with intact comprehension of complex commands, naming and repetition.  Cranial nerves:  PERRL, conjugate gaze, EOMI, facial sensation intact, questionable left nasolabial fold flattening, shoulder shrug strong, tongue/uvula midline, no dysarthria.   Motor: Normal bulk and tone. No abnormal movements. 5/5 strength in 4/4 extremities.   Reflexes: toes down-going.  Sensory: Intact to light touch x4  Coordination: Has mild dysmetria in b/l upper ext  Gait: deferred    Labs/Studies:  Last Comprehensive Metabolic Panel:  Sodium   Date Value Ref Range Status   04/24/2021 133 133 - 144 mmol/L Final     Potassium   Date Value Ref Range Status   04/24/2021 3.5 3.4 - 5.3 mmol/L Final     Chloride   Date Value Ref Range Status   04/24/2021 99 94 - 109 mmol/L Final     Carbon Dioxide   Date Value Ref Range Status   04/24/2021 26 20 - 32 mmol/L Final     Anion Gap   Date Value Ref Range Status   04/24/2021 8 3 - 14 mmol/L Final     Glucose   Date Value Ref Range Status   04/24/2021 260 (H) 70 - 99 mg/dL Final     Urea Nitrogen   Date Value Ref Range Status   04/24/2021 18 7 - 30 mg/dL Final     Creatinine   Date Value Ref Range Status   04/24/2021 0.79 0.66 - 1.25 mg/dL Final     GFR Estimate   Date Value Ref Range Status   04/24/2021 82 >60 mL/min/[1.73_m2] Final     Comment:     Non  GFR Calc  Starting 12/18/2018, serum creatinine based estimated GFR (eGFR) will be   calculated using the Chronic Kidney Disease Epidemiology Collaboration   (CKD-EPI) equation.       Calcium   Date Value Ref Range Status   04/24/2021 8.6 8.5 - 10.1 mg/dL Final     Lab Results   Component Value Date    WBC 14.8 04/24/2021     Lab Results   Component Value Date    RBC 4.22 04/24/2021     Lab Results   Component Value Date    HGB 12.4 04/24/2021     Lab Results   Component Value Date    HCT 37.1 04/24/2021     Lab Results   Component Value Date    MCV 88 04/24/2021     Lab Results   Component Value Date    MCH 29.4 04/24/2021     Lab Results   Component Value Date    MCHC 33.4 04/24/2021     Lab Results   Component Value  Date    RDW 13.2 04/24/2021     Lab Results   Component Value Date     04/24/2021       Imaging:  Initial Head CT 04/24/21      ICH Score Tool Patient's Score   Age ? 80 years 1 point 1   GCS score  3-4  5-12   13-15    2 point  1 point  0 point 0   Hematoma volume, cm3 ? 30 1 point 0   Intraventricular extension 1 point 0   Infratentorial location 1 point 1   Patient s ICH Score (0-6) = 2       Assessment and Plan:  Ronen Small is a 85 year old year old with PMH of DM and HTN transferred from OSH with acute cerebellar hemorrhage.     Neuro:  #Acute cerebellar hemorrhage, ICH Score = 2, Etiology most likly hypertensive  - On initial head CT, hemorrhage has light mass effect on 4th ventricle, without  complete effacement  - SBP Goal < 140  - Repeat head CT in 6 hours  - INR 1 and PTT 11.2, not on PTA MARCI  - Will hold PTA Aspirin and DVT ppx  - No indication for seizure ppx       Cardiovascular:  #HTN  Continuous cardiac monitoring   Goal SBP < 140  - Nicardipine gtt PRN  - Labetalol and Hydralazine PRN  - PTA Losartan 25mg BID  - PTA Amlodipine 5mg qhs      #Hyperlipidemia  - Hold PTA Pravastatin      Respiratory:  #Hx of KAPIL  Intolerant of Cpap at home      Gastrointestinal:  # GERD  - PTA Pantoprazol       Diet: NPO except meds      Renal:  ERWIN      Endocrine:  #DM  - Hold PTA Metformin  - Hold PTA Glimepiride  - Glucose monitoring  - Sliding scale      Hematological  #Chronic iron deficiency anemia  - Monitor CBC    ID  ERWIN  No fever and leukemia      DVT ppx: SCD  Lines: PIV  Code: Full code    Pt was staffed with N[facility] Fellow Dr. Back, [facility] attending is Dr. Francisco.    Twan Martinez MD  Neurology Resident PGY2  Pager [phone]

## 2021-04-25 NOTE — PLAN OF CARE
Admitted/transferred from: [facility]  Reason for admission/transfer: cerebellar hemorrhage  2 RN skin assessment: completed by Miguel ANDREWS  Result of skin assessment and interventions/actions: see flowsheets  Height, weight, drug calc weight: done  Patient belongings (see Flowsheet): hearing aids in black case, shoes, socks, black baseball cap, overalls, jacket, vest, overshirt, two shirts, cell phone, wallet  MDRO education added to care plan: n/a  ?

## 2021-04-25 NOTE — PROCEDURES
[facility]    Arterial line placement    Date/Time: 4/25/2021 6:39 PM  Performed by: Radhika Enriquez MD  Authorized by: Radhika Enriquez MD     UNIVERSAL PROTOCOL   Site Marked: NA  Prior Images Obtained and Reviewed:  NA  Required items: Required blood products, implants, devices and special equipment available    Patient identity confirmed:  Verbally with patient and arm band  NA - No sedation, light sedation, or local anesthesia  Confirmation Checklist:  Patient's identity using two indicators, procedure was appropriate and matched the consent or emergent situation, correct equipment/implants were available and relevant allergies  Time out: Immediately prior to the procedure a time out was called    Universal Protocol: the Joint Commission Universal Protocol was followed    Preparation: Patient was prepped and draped in usual sterile fashion    ESBL (mL):  0      Indication: hemodynamic monitoring  Location: left radial       ANESTHESIA    Anesthesia: Local infiltration  Local Anesthetic:  Lidocaine 1% without epinephrine  Anesthetic Total (mL):  3      SEDATION    Patient Sedated: No      PROCEDURE DETAILS  Khurram's Test Normal?: Khurram's test not abnormal  Needle Gauge:  20  Seldinger technique: Seldinger technique used    Number of Attempts:  2  Post-procedure:  Line sutured and dressing applied  CMS: normal  PROCEDURE   Patient Tolerance:  Patient tolerated the procedure well with no immediate complications     Attestation:  Physician Attestation   I spent a total of 5 minutes with the patient, personally observing as the resident/fellow performed arterial line placement.     Key findings: Vessel identified, artery entered easily, catheter threaded, good blood return, good wave on monitor, no immediate complications.    Shahbaz Francisco  Date of Service (when I saw the patient): 4/25/21  Length of time physician/provider present for 1:1 monitoring  during sedation: 0

## 2021-04-25 NOTE — PROGRESS NOTES
"Neuroscience Intensive Care Progress Note  2021    REASON FOR CRITICAL CARE ADMISSION: ICH     ADMITTING PHYSICIAN: Shahbaz Francisco MD     Date of Service (when I saw the patient): 21    Problem List  1. Acute cerebellar hemorrhage  2. HTN  3. DMII  4. GERD  5. KAPIL  6. Nocturia    24 hour events:  No acute events overnight.    24 Hour Vital Signs Summary:  Temperatures:  Current - Temp: 98.8  F (37.1  C); Max - Temp  Av.1  F (36.7  C)  Min: 97.7  F (36.5  C)  Max: 98.8  F (37.1  C)  Respiration range: Resp  Av  Min: 16  Max: 16  Pulse range: Pulse  Av.1  Min: 70  Max: 105  Blood pressure range: Systolic (24hrs), Av , Min:119 , Max:163   ; Diastolic (24hrs), Av, Min:57, Max:95  BP - Mean:  [] 108  Pulse oximetry range: SpO2  Av.6 %  Min: 89 %  Max: 98 %    Ventilator Settings  Resp: 16  RA      Intake/Output Summary (Last 24 hours) at 2021 0841  Last data filed at 2021 0811  Gross per 24 hour   Intake 291.35 ml   Output 905 ml   Net -613.65 ml       Current Medications:    amLODIPine  5 mg Oral or Feeding Tube Daily     finasteride  5 mg Oral or Feeding Tube Daily     insulin aspart  1-4 Units Subcutaneous Q4H     losartan  25 mg Oral or Feeding Tube BID     pantoprazole  40 mg Oral or Feeding Tube Daily       PRN Medications:  glucose **OR** dextrose **OR** glucagon, hydrALAZINE, labetalol, prochlorperazine    Infusions:    niCARdipine 7.5 mg/hr (21 0740)       Allergies   Allergen Reactions     Atorvastatin Other (See Comments)     Myalgias     Fluoxetine Other (See Comments)     Hypersomnolence     Indomethacin Other (See Comments)     Hypersomnolence       Physical Examination:  Vitals: BP (!) 151/76   Pulse 87   Temp 98.8  F (37.1  C) (Oral)   Resp 16   Ht 1.727 m (5' 8\")   Wt 72.1 kg (158 lb 15.2 oz)   SpO2 95%   BMI 24.17 kg/m    General: Adult male patient, lying in bed, NAD  HEENT: Normocephalic/Atraumatic, no icterus, Oral " cavity/Oropharynx pink and moist  Cardiac: RRR  Chest: No SOB, pattern regular, unlabored, expansion symmetric, no retractions or use of accessory muscles  Abdomen: Soft, bowel sounds present  Extremities: Warm, no edema  Skin: No rash or lesion   Psych: Calm and cooperative  Neuro:  Mental status: Awake, alert, attentive, oriented to self, time, place, and circumstance. Language is fluent and coherent with intact comprehension of complex commands, naming and repetition.  Cranial nerves: PERRL, conjugate gaze, Left eye nystagmus, EOMI, pupils +3 round and reactive, facial sensation intact, L facial droop, shoulder shrug strong, tongue/uvula midline, no dysarthria.   Motor: Normal bulk and tone. No abnormal movements. 5/5 strength in 4/4 extremities.   Sensory: Intact to light touch x 4 extremities  Coordination: Left arm FNF with ataxia. Right arm FNF without ataxia. Bilateral HS without ataxia or dysmetria.   Gait: FREEDOM, deferred.       Labs/Studies:  Recent Labs   Lab Test 21  0402 21  2237    133   POTASSIUM 3.4 3.5   CHLORIDE 98 99   CO2 27 26   ANIONGAP 7 8   * 260*   BUN 19 18   CR 0.75 0.79   PRABHU 8.5 8.6   WBC 10.2 14.8*   RBC 4.03* 4.22*   HGB 11.6* 12.4*    249       Recent Labs   Lab Test 21  2237   INR 1.03         Imagin. CTA Head Neck w/contrast on 21 at 1001  Impression:    1. 3 mm vascular outpouching medial to distal left PCA, possibly a  small aneurysm versus a venous structure. This could potentially be  the source of the posterior fossa hemorrhage. Please note that the  left vertebral artery arises directly from the aortic arch.  2. No large vessel occlusion or other sizable aneurysm within the  intracerebral vasculature.  3. No hemodynamically suggesting stenosis or occlusion of the neck  vasculature.    2. CT Head w/o contrast on 21 at 1001  Impression:  1. Marginal increase in intraparenchymal hemorrhage of the left midline  cerebellum with  similar partial effacement of the fourth ventricle.    3. CT Head w/o contrast on 4/25/21 at 0131  Impression:  1. Acute intraparenchymal hemorrhage of the cerebellum measuring 2.7  cm. Partial effacement of the fourth ventricle.  2. Mild generalized parenchymal volume loss and sequelae of chronic small vessel ischemic disease.    Assessment/Plan  Ronen Small is a 85 year old admitted on 4/24/2021 with acute cerebellar hemorrhage. SBPs at OSH > 200's. Requiring Nicardipine gtt. CTA with 3 mm vascular outpouching medial to distal left PCA, possibly a small aneurysm versus a venous structure. Plan for diagnostic cerebral angiogram with intent to treat tomorrow 4/26.    Plan  Neuro:  #Acute cerebellar hemorrhage, (ICH Score - 2)  Etiology of bleed aneurysmal vs hypertensive--Will treat as aneurysmal at this time and plan for diagnostic cerebral angiogram on 4/26  - SBP < 140  - Daily TCD's  - Start Nimodipine 60 mg q4h x 21 days  - Start Keppra 500 mg q12h  - Holding PTA Aspirin and DVT Ppx  - HOB > 30  - PT/OT     #Initial CT head with mass effect on 4th ventricle, without complete effacement.   - EVD not indicated at this time.    #Headache  - Tylenol 650 mg q4h    CV:  #HTN  ECHO completed 4/25; EF 65-70%  - SBP < 140  - Nicardipine gtt  - PTA Amlodipine 5 mg daily  - PTA Losartan 25 mg BID  - Hydralazine and Labetalol PRN    Pulm:  #KAPIL  - On RA, may need FIO2  - Continuous pulse ox monitoring  - Maintain O2 saturations > 92%    GI/Nutrition:  #GERD  - Protonix 40 mg daily    - Diet: Regular diet; thin liquids (NPO p midnight)  - Last BM--Pre admission   Bowel regimen: Scheduled Senna-docusate and Miralax daily     Renal:  #Nocturia  - Finasteride 5 mg daily    - IV Fluids: Plasma Lyte @ 100 ml/hr  - BMP daily  - Magnesium oxide 400 mg BID  - Potassium & Sodium phosphate 1 Pkt BID  - Electrolyte replacement protocol in place    Endo:  #DMII  #Hyperglycemia   - High insulin SS  - Follow glucose  levels    Heme:  #Normocyctic anemia; 11.6  - CBC daily    ID:  - ERWIN  - Follow fever curve    PPX:  DVT Prophylaxis  - SCDs in place  GI Prophylaxis  - On PTA Pantoprazole 40 mg    Lines/Tubes/Drains:  - PIV x 2    Code Status: Full    Dispo: ICU    The patient was seen and discussed with the attending, Dr. Francisco.    Amanda ELAM, CNP  NeuroCritical Care Nurse Practitioner  Pager: [phone]  Ascom: *[phone] available [facility] [phone] to [phone]

## 2021-04-25 NOTE — PLAN OF CARE
Major Shift Events:  Admitted from OSH for cerebellar bleed. Neuro checks q1 hour, intact except slight L facial droop, dizziness, and double vision, baseline neuropathy in BLE. Garcia present on arrival, removed with pt voiding dark rudy urine into urinal at bedside after. Denies pain and headache. Nicardipine drip at 6 to maintain SBP goal of <140, PRNs also available. Emesis x1 this AM, pt states possibly d/t sitting up slightly to use urinal. Compazine ordered PRN, pt with QTc >500 on ECG.    Plan: Continue to monitor neuro status closely.    For vital signs and complete assessments, please see documentation flowsheets.

## 2021-04-25 NOTE — PROGRESS NOTES
Neuro Interventional Progress Note    2021    Ronen Small is a 85 year old year old man admitted on 2021. He was playing cards with his friend when he noticed feeling unwell. Later he developed dizziness with nausea and vomiting. EMS was called. He was found to have a posterior fossa hemorrhage involving the cerebellum in the midline posterior to the fourth ventricle with minimal IVH. He remains otherwise neurologically intact. He does not smoke nor does he have any family history of cerebral aneurysms. He is hypertensive but is controlled on medications. 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      24 Hour Vital Signs Summary:  Temperatures:  Current - Temp: 97.7  F (36.5  C); Max - Temp  Av  F (36.7  C)  Min: 97.7  F (36.5  C)  Max: 98.8  F (37.1  C)  Respiration range: Resp  Avg: 15.5  Min: 14  Max: 16  Pulse range: Pulse  Av.5  Min: 64  Max: 105  Blood pressure range: Systolic (24hrs), Av , Min:94 , Max:163   ; Diastolic (24hrs), Av, Min:57, Max:95    Pulse oximetry range: SpO2  Av.8 %  Min: 89 %  Max: 98 %    Ventilator Settings  Resp: 14        Intake/Output Summary (Last 24 hours) at 2021 1531  Last data filed at 2021 1400  Gross per 24 hour   Intake 782.18 ml   Output 1105 ml   Net -322.82 ml       BP - Mean:  [] 89    Current Medications:    amLODIPine  5 mg Oral or Feeding Tube Daily     finasteride  5 mg Oral or Feeding Tube Daily     insulin aspart  1-12 Units Subcutaneous Q4H     levETIRAcetam  500 mg Intravenous BID     losartan  25 mg Oral or Feeding Tube BID     magnesium oxide  400 mg Oral BID     niMODipine  60 mg Oral Q4H MARGARET     pantoprazole  40 mg Oral QAM AC     polyethylene glycol  17 g Oral or Feeding Tube Daily     potassium & sodium phosphates  1 packet Oral or Feeding Tube BID     senna-docusate  1 tablet Oral or Feeding Tube BID       PRN Medications:  glucose **OR** dextrose **OR** glucagon, fentaNYL, flumazenil,  "heparin, hydrALAZINE, labetalol, lidocaine (buffered or not buffered), midazolam, naloxone **OR** naloxone **OR** naloxone **OR** naloxone, prochlorperazine    Infusions:    heparin       niCARdipine 7.5 mg/hr (04/25/21 1423)     Plasma-Lyte A 100 mL/hr (04/25/21 1400)       Allergies   Allergen Reactions     Atorvastatin Other (See Comments)     Myalgias     Fluoxetine Other (See Comments)     Hypersomnolence     Indomethacin Other (See Comments)     Hypersomnolence       Physical Examination:  /61   Pulse 67   Temp 97.7  F (36.5  C) (Oral)   Resp 14   Ht 1.727 m (5' 8\")   Wt 72.1 kg (158 lb 15.2 oz)   SpO2 93%   BMI 24.17 kg/m        Mental Status: Alert and oriented. Follows command with normal comprehension and speech.  CN: PEERLA, Normal extraocular eye movement. Visual fields are intact. Facial movement symmetric. Facial sensation intact. Tongue midline. Normal shoulder shrug.  Motor: 5/5 in all four extremities.  Sensory:Gross sensations intact bilaterally.  Co-ordination: Finger nose finger is intact.          Labs/Studies:  Recent Labs   Lab Test 04/25/21  1343 04/25/21  0402 04/24/21  2237   NA  --  133 133   POTASSIUM 3.3* 3.4 3.5   CHLORIDE  --  98 99   CO2  --  27 26   ANIONGAP  --  7 8   GLC  --  218* 260*   BUN  --  19 18   CR  --  0.75 0.79   PRABHU  --  8.5 8.6   WBC  --  10.2 14.8*   RBC  --  4.03* 4.22*   HGB  --  11.6* 12.4*   PLT  --  258 249       Recent Labs   Lab Test 04/24/21  2237   INR 1.03         No lab results found in last 7 days.        Imaging:  3 mm vascular outpouching medial to distal left PICA, possibly a  small aneurysm versus a venous structure. This could potentially be  the source of the posterior fossa hemorrhage. Please note that the  left vertebral artery arises directly from the aortic arch.. No large vessel occlusion or other sizable aneurysm within the  intracerebral vasculature.    Assessment/Plan    Mr. Small is a 85 y.o otherwise healthy male. He seems to " be doing well today. His nausea is significantly improved compared to yesterday. 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 Damaris Baker  Fellow  [id number]    I have reviewed the history above and agree with the fellow's assessment and plan.  LAKSHMI Ocampo MD

## 2021-04-25 NOTE — PROGRESS NOTES
"SPIRITUAL HEALTH SERVICES  [facility] 4A  ON-CALL VISIT     REFERRAL SOURCE: Initial unit  visit with patient Ronen Small and wife Tracey, per request for hospital  visit as noted in initial nursing assessment.     Pt in good spirits, very pleasant, welcomed spiritual support today. Pt talked about his very recent \"stroke - or maybe it would be called a mini-stroke\", said he had been reasonably healthy previously. 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     PLAN: unit  will be informed of visit.     Joseph Gregg) Kwame Sylvester M.Div., [facility]  Staff   Pager [phone]     * [facility] remains available 24/7 for emergent requests/referrals, either by having the switchboard page the on-call  or by entering an ASAP/STAT consult in Epic (this will also page the on-call ). Routine Epic consults receive an initial response within 24 hours.*                                                                                          "

## 2021-04-26 ENCOUNTER — APPOINTMENT (OUTPATIENT)
Dept: INTERVENTIONAL RADIOLOGY/VASCULAR | Facility: CLINIC | Age: 86
DRG: 064 | End: 2021-04-26
Attending: PSYCHIATRY & NEUROLOGY
Payer: MEDICARE

## 2021-04-26 ENCOUNTER — TRANSFERRED RECORDS (OUTPATIENT)
Dept: HEALTH INFORMATION MANAGEMENT | Facility: CLINIC | Age: 86
End: 2021-04-26

## 2021-04-26 ENCOUNTER — APPOINTMENT (OUTPATIENT)
Dept: ULTRASOUND IMAGING | Facility: CLINIC | Age: 86
DRG: 064 | End: 2021-04-26
Attending: PSYCHIATRY & NEUROLOGY
Payer: MEDICARE

## 2021-04-26 ENCOUNTER — APPOINTMENT (OUTPATIENT)
Dept: PHYSICAL THERAPY | Facility: CLINIC | Age: 86
DRG: 064 | End: 2021-04-26
Attending: STUDENT IN AN ORGANIZED HEALTH CARE EDUCATION/TRAINING PROGRAM
Payer: MEDICARE

## 2021-04-26 LAB
ANION GAP SERPL CALCULATED.3IONS-SCNC: 7 MMOL/L (ref 3–14)
BUN SERPL-MCNC: 17 MG/DL (ref 7–30)
CALCIUM SERPL-MCNC: 8 MG/DL (ref 8.5–10.1)
CHLORIDE SERPL-SCNC: 104 MMOL/L (ref 94–109)
CO2 SERPL-SCNC: 26 MMOL/L (ref 20–32)
CREAT SERPL-MCNC: 0.89 MG/DL (ref 0.66–1.25)
ERYTHROCYTE [DISTWIDTH] IN BLOOD BY AUTOMATED COUNT: 13.4 % (ref 10–15)
GFR SERPL CREATININE-BSD FRML MDRD: 78 ML/MIN/{1.73_M2}
GLUCOSE BLDC GLUCOMTR-MCNC: 147 MG/DL (ref 70–99)
GLUCOSE BLDC GLUCOMTR-MCNC: 150 MG/DL (ref 70–99)
GLUCOSE BLDC GLUCOMTR-MCNC: 174 MG/DL (ref 70–99)
GLUCOSE BLDC GLUCOMTR-MCNC: 190 MG/DL (ref 70–99)
GLUCOSE BLDC GLUCOMTR-MCNC: 200 MG/DL (ref 70–99)
GLUCOSE BLDC GLUCOMTR-MCNC: 238 MG/DL (ref 70–99)
GLUCOSE SERPL-MCNC: 152 MG/DL (ref 70–99)
HCT VFR BLD AUTO: 33.3 % (ref 40–53)
HGB BLD-MCNC: 11.2 G/DL (ref 13.3–17.7)
INTERPRETATION ECG - MUSE: NORMAL
LDLC SERPL DIRECT ASSAY-MCNC: 58 MG/DL
MCH RBC QN AUTO: 29.2 PG (ref 26.5–33)
MCHC RBC AUTO-ENTMCNC: 33.6 G/DL (ref 31.5–36.5)
MCV RBC AUTO: 87 FL (ref 78–100)
PLATELET # BLD AUTO: 241 10E9/L (ref 150–450)
POTASSIUM SERPL-SCNC: 3.7 MMOL/L (ref 3.4–5.3)
RBC # BLD AUTO: 3.84 10E12/L (ref 4.4–5.9)
SODIUM SERPL-SCNC: 136 MMOL/L (ref 133–144)
WBC # BLD AUTO: 9.1 10E9/L (ref 4–11)

## 2021-04-26 PROCEDURE — 85027 COMPLETE CBC AUTOMATED: CPT | Performed by: STUDENT IN AN ORGANIZED HEALTH CARE EDUCATION/TRAINING PROGRAM

## 2021-04-26 PROCEDURE — 99291 CRITICAL CARE FIRST HOUR: CPT | Performed by: PSYCHIATRY & NEUROLOGY

## 2021-04-26 PROCEDURE — 99152 MOD SED SAME PHYS/QHP 5/>YRS: CPT

## 2021-04-26 PROCEDURE — C1760 CLOSURE DEV, VASC: HCPCS

## 2021-04-26 PROCEDURE — 250N000011 HC RX IP 250 OP 636: Performed by: STUDENT IN AN ORGANIZED HEALTH CARE EDUCATION/TRAINING PROGRAM

## 2021-04-26 PROCEDURE — 83721 ASSAY OF BLOOD LIPOPROTEIN: CPT | Performed by: STUDENT IN AN ORGANIZED HEALTH CARE EDUCATION/TRAINING PROGRAM

## 2021-04-26 PROCEDURE — 250N000009 HC RX 250: Performed by: STUDENT IN AN ORGANIZED HEALTH CARE EDUCATION/TRAINING PROGRAM

## 2021-04-26 PROCEDURE — 272N000566 HC SHEATH CR3

## 2021-04-26 PROCEDURE — 272N000192 HC ACCESSORY CR2

## 2021-04-26 PROCEDURE — 76937 US GUIDE VASCULAR ACCESS: CPT | Mod: 26 | Performed by: NEUROLOGICAL SURGERY

## 2021-04-26 PROCEDURE — 36226 PLACE CATH VERTEBRAL ART: CPT | Mod: LT | Performed by: NEUROLOGICAL SURGERY

## 2021-04-26 PROCEDURE — 97116 GAIT TRAINING THERAPY: CPT | Mod: GP

## 2021-04-26 PROCEDURE — 250N000013 HC RX MED GY IP 250 OP 250 PS 637: Performed by: STUDENT IN AN ORGANIZED HEALTH CARE EDUCATION/TRAINING PROGRAM

## 2021-04-26 PROCEDURE — 36223 PLACE CATH CAROTID/INOM ART: CPT | Mod: 50 | Performed by: NEUROLOGICAL SURGERY

## 2021-04-26 PROCEDURE — C1769 GUIDE WIRE: HCPCS

## 2021-04-26 PROCEDURE — 250N000009 HC RX 250: Performed by: PSYCHIATRY & NEUROLOGY

## 2021-04-26 PROCEDURE — 36226 PLACE CATH VERTEBRAL ART: CPT

## 2021-04-26 PROCEDURE — B31R1ZZ FLUOROSCOPY OF INTRACRANIAL ARTERIES USING LOW OSMOLAR CONTRAST: ICD-10-PCS | Performed by: NEUROLOGICAL SURGERY

## 2021-04-26 PROCEDURE — 250N000013 HC RX MED GY IP 250 OP 250 PS 637: Performed by: NURSE PRACTITIONER

## 2021-04-26 PROCEDURE — 255N000002 HC RX 255 OP 636: Performed by: NEUROLOGICAL SURGERY

## 2021-04-26 PROCEDURE — 999N000015 HC STATISTIC ARTERIAL MONITORING DAILY

## 2021-04-26 PROCEDURE — C1887 CATHETER, GUIDING: HCPCS

## 2021-04-26 PROCEDURE — 93886 INTRACRANIAL COMPLETE STUDY: CPT | Mod: 26 | Performed by: RADIOLOGY

## 2021-04-26 PROCEDURE — 250N000013 HC RX MED GY IP 250 OP 250 PS 637: Performed by: PSYCHIATRY & NEUROLOGY

## 2021-04-26 PROCEDURE — 97162 PT EVAL MOD COMPLEX 30 MIN: CPT | Mod: GP

## 2021-04-26 PROCEDURE — 99153 MOD SED SAME PHYS/QHP EA: CPT

## 2021-04-26 PROCEDURE — 999N001017 HC STATISTIC GLUCOSE BY METER IP

## 2021-04-26 PROCEDURE — 93886 INTRACRANIAL COMPLETE STUDY: CPT

## 2021-04-26 PROCEDURE — 200N000002 HC R&B ICU UMMC

## 2021-04-26 PROCEDURE — 258N000003 HC RX IP 258 OP 636: Performed by: STUDENT IN AN ORGANIZED HEALTH CARE EDUCATION/TRAINING PROGRAM

## 2021-04-26 PROCEDURE — 36224 PLACE CATH CAROTD ART: CPT | Mod: LT

## 2021-04-26 PROCEDURE — 36223 PLACE CATH CAROTID/INOM ART: CPT | Mod: RT

## 2021-04-26 PROCEDURE — 36225 PLACE CATH SUBCLAVIAN ART: CPT | Mod: RT

## 2021-04-26 PROCEDURE — 36225 PLACE CATH SUBCLAVIAN ART: CPT | Mod: RT | Performed by: NEUROLOGICAL SURGERY

## 2021-04-26 PROCEDURE — 99152 MOD SED SAME PHYS/QHP 5/>YRS: CPT | Mod: GC | Performed by: NEUROLOGICAL SURGERY

## 2021-04-26 PROCEDURE — 80048 BASIC METABOLIC PNL TOTAL CA: CPT | Performed by: STUDENT IN AN ORGANIZED HEALTH CARE EDUCATION/TRAINING PROGRAM

## 2021-04-26 PROCEDURE — 97530 THERAPEUTIC ACTIVITIES: CPT | Mod: GP

## 2021-04-26 RX ORDER — MAGNESIUM SULFATE HEPTAHYDRATE 40 MG/ML
2 INJECTION, SOLUTION INTRAVENOUS ONCE
Status: COMPLETED | OUTPATIENT
Start: 2021-04-26 | End: 2021-04-26

## 2021-04-26 RX ORDER — SODIUM CHLORIDE 9 MG/ML
INJECTION, SOLUTION INTRAVENOUS CONTINUOUS
Status: DISCONTINUED | OUTPATIENT
Start: 2021-04-26 | End: 2021-04-26

## 2021-04-26 RX ORDER — ONDANSETRON 4 MG/1
4 TABLET, ORALLY DISINTEGRATING ORAL EVERY 6 HOURS PRN
Status: DISCONTINUED | OUTPATIENT
Start: 2021-04-26 | End: 2021-04-27

## 2021-04-26 RX ORDER — ONDANSETRON 2 MG/ML
4 INJECTION INTRAMUSCULAR; INTRAVENOUS EVERY 6 HOURS PRN
Status: DISCONTINUED | OUTPATIENT
Start: 2021-04-26 | End: 2021-04-27

## 2021-04-26 RX ORDER — PROCHLORPERAZINE MALEATE 5 MG
5 TABLET ORAL EVERY 6 HOURS PRN
Status: DISCONTINUED | OUTPATIENT
Start: 2021-04-26 | End: 2021-04-27

## 2021-04-26 RX ORDER — LOSARTAN POTASSIUM 25 MG/1
25 TABLET ORAL DAILY
Status: DISCONTINUED | OUTPATIENT
Start: 2021-04-26 | End: 2021-04-26

## 2021-04-26 RX ORDER — LOSARTAN POTASSIUM 25 MG/1
25 TABLET ORAL ONCE
Status: COMPLETED | OUTPATIENT
Start: 2021-04-26 | End: 2021-04-26

## 2021-04-26 RX ORDER — LEVETIRACETAM 500 MG/1
500 TABLET ORAL 2 TIMES DAILY
Status: DISCONTINUED | OUTPATIENT
Start: 2021-04-26 | End: 2021-04-27

## 2021-04-26 RX ORDER — PROCHLORPERAZINE 25 MG
12.5 SUPPOSITORY, RECTAL RECTAL EVERY 12 HOURS PRN
Status: DISCONTINUED | OUTPATIENT
Start: 2021-04-26 | End: 2021-04-27

## 2021-04-26 RX ORDER — IODIXANOL 320 MG/ML
150 INJECTION, SOLUTION INTRAVASCULAR ONCE
Status: COMPLETED | OUTPATIENT
Start: 2021-04-26 | End: 2021-04-26

## 2021-04-26 RX ORDER — POTASSIUM CHLORIDE 7.45 MG/ML
10 INJECTION INTRAVENOUS
Status: COMPLETED | OUTPATIENT
Start: 2021-04-26 | End: 2021-04-26

## 2021-04-26 RX ADMIN — LABETALOL HYDROCHLORIDE 10 MG: 5 INJECTION, SOLUTION INTRAVENOUS at 15:20

## 2021-04-26 RX ADMIN — SODIUM PHOSPHATE, MONOBASIC, MONOHYDRATE AND SODIUM PHOSPHATE, DIBASIC, ANHYDROUS 9 MMOL: 276; 142 INJECTION, SOLUTION INTRAVENOUS at 09:40

## 2021-04-26 RX ADMIN — NIMODIPINE 60 MG: 30 CAPSULE, LIQUID FILLED ORAL at 20:17

## 2021-04-26 RX ADMIN — LABETALOL HYDROCHLORIDE 10 MG: 5 INJECTION, SOLUTION INTRAVENOUS at 15:40

## 2021-04-26 RX ADMIN — HYDRALAZINE HYDROCHLORIDE 10 MG: 20 INJECTION, SOLUTION INTRAMUSCULAR; INTRAVENOUS at 08:06

## 2021-04-26 RX ADMIN — DOCUSATE SODIUM 50 MG AND SENNOSIDES 8.6 MG 1 TABLET: 8.6; 5 TABLET, FILM COATED ORAL at 20:17

## 2021-04-26 RX ADMIN — LABETALOL HYDROCHLORIDE 10 MG: 5 INJECTION, SOLUTION INTRAVENOUS at 17:30

## 2021-04-26 RX ADMIN — SODIUM CHLORIDE, SODIUM GLUCONATE, SODIUM ACETATE, POTASSIUM CHLORIDE AND MAGNESIUM CHLORIDE 100 ML/HR: 526; 502; 368; 37; 30 INJECTION, SOLUTION INTRAVENOUS at 06:57

## 2021-04-26 RX ADMIN — HYDRALAZINE HYDROCHLORIDE 20 MG: 20 INJECTION, SOLUTION INTRAMUSCULAR; INTRAVENOUS at 17:46

## 2021-04-26 RX ADMIN — NIMODIPINE 60 MG: 30 CAPSULE, LIQUID FILLED ORAL at 07:49

## 2021-04-26 RX ADMIN — MIDAZOLAM 0.5 MG: 1 INJECTION INTRAMUSCULAR; INTRAVENOUS at 09:16

## 2021-04-26 RX ADMIN — MIDAZOLAM 1 MG: 1 INJECTION INTRAMUSCULAR; INTRAVENOUS at 08:32

## 2021-04-26 RX ADMIN — LOSARTAN POTASSIUM 25 MG: 25 TABLET, FILM COATED ORAL at 15:06

## 2021-04-26 RX ADMIN — LOSARTAN POTASSIUM 25 MG: 25 TABLET, FILM COATED ORAL at 07:49

## 2021-04-26 RX ADMIN — PANTOPRAZOLE SODIUM 40 MG: 40 TABLET, DELAYED RELEASE ORAL at 07:49

## 2021-04-26 RX ADMIN — NIMODIPINE 60 MG: 30 CAPSULE, LIQUID FILLED ORAL at 15:06

## 2021-04-26 RX ADMIN — POLYETHYLENE GLYCOL 3350 17 G: 17 POWDER, FOR SOLUTION ORAL at 13:36

## 2021-04-26 RX ADMIN — LABETALOL HYDROCHLORIDE 10 MG: 5 INJECTION, SOLUTION INTRAVENOUS at 14:44

## 2021-04-26 RX ADMIN — HYDRALAZINE HYDROCHLORIDE 20 MG: 20 INJECTION, SOLUTION INTRAMUSCULAR; INTRAVENOUS at 11:25

## 2021-04-26 RX ADMIN — LABETALOL HYDROCHLORIDE 10 MG: 5 INJECTION, SOLUTION INTRAVENOUS at 12:07

## 2021-04-26 RX ADMIN — POTASSIUM CHLORIDE 10 MEQ: 7.46 INJECTION, SOLUTION INTRAVENOUS at 06:36

## 2021-04-26 RX ADMIN — MAGNESIUM SULFATE HEPTAHYDRATE 2 G: 40 INJECTION, SOLUTION INTRAVENOUS at 05:37

## 2021-04-26 RX ADMIN — POTASSIUM CHLORIDE 10 MEQ: 7.46 INJECTION, SOLUTION INTRAVENOUS at 07:36

## 2021-04-26 RX ADMIN — NIMODIPINE 60 MG: 30 CAPSULE, LIQUID FILLED ORAL at 04:07

## 2021-04-26 RX ADMIN — LEVETIRACETAM 500 MG: 500 TABLET, FILM COATED ORAL at 22:17

## 2021-04-26 RX ADMIN — AMLODIPINE BESYLATE 5 MG: 5 TABLET ORAL at 07:49

## 2021-04-26 RX ADMIN — LOSARTAN POTASSIUM 25 MG: 25 TABLET, FILM COATED ORAL at 20:17

## 2021-04-26 RX ADMIN — HYDRALAZINE HYDROCHLORIDE 20 MG: 20 INJECTION, SOLUTION INTRAMUSCULAR; INTRAVENOUS at 14:04

## 2021-04-26 RX ADMIN — FENTANYL CITRATE 50 MCG: 50 INJECTION, SOLUTION INTRAMUSCULAR; INTRAVENOUS at 08:32

## 2021-04-26 RX ADMIN — NIMODIPINE 60 MG: 30 CAPSULE, LIQUID FILLED ORAL at 00:07

## 2021-04-26 RX ADMIN — HYDRALAZINE HYDROCHLORIDE 10 MG: 20 INJECTION, SOLUTION INTRAMUSCULAR; INTRAVENOUS at 10:20

## 2021-04-26 RX ADMIN — FINASTERIDE 5 MG: 5 TABLET, FILM COATED ORAL at 07:49

## 2021-04-26 RX ADMIN — FENTANYL CITRATE 50 MCG: 50 INJECTION, SOLUTION INTRAMUSCULAR; INTRAVENOUS at 09:16

## 2021-04-26 RX ADMIN — IODIXANOL 110 ML: 320 INJECTION, SOLUTION INTRAVASCULAR at 09:22

## 2021-04-26 RX ADMIN — DOCUSATE SODIUM 50 MG AND SENNOSIDES 8.6 MG 1 TABLET: 8.6; 5 TABLET, FILM COATED ORAL at 13:36

## 2021-04-26 RX ADMIN — HEPARIN SODIUM 5 BAG: 200 INJECTION, SOLUTION INTRAVENOUS at 08:53

## 2021-04-26 RX ADMIN — HYDRALAZINE HYDROCHLORIDE 20 MG: 20 INJECTION, SOLUTION INTRAMUSCULAR; INTRAVENOUS at 16:00

## 2021-04-26 RX ADMIN — NIMODIPINE 60 MG: 30 CAPSULE, LIQUID FILLED ORAL at 11:25

## 2021-04-26 RX ADMIN — LEVETIRACETAM 500 MG: 5 INJECTION INTRAVENOUS at 10:24

## 2021-04-26 RX ADMIN — NICARDIPINE HYDROCHLORIDE 2.5 MG/HR: 0.2 INJECTION, SOLUTION INTRAVENOUS at 18:01

## 2021-04-26 RX ADMIN — LIDOCAINE HYDROCHLORIDE 8 ML: 10 INJECTION, SOLUTION EPIDURAL; INFILTRATION; INTRACAUDAL; PERINEURAL at 08:53

## 2021-04-26 RX ADMIN — HYDRALAZINE HYDROCHLORIDE 10 MG: 20 INJECTION, SOLUTION INTRAMUSCULAR; INTRAVENOUS at 05:12

## 2021-04-26 ASSESSMENT — ACTIVITIES OF DAILY LIVING (ADL)
DEPENDENT_IADLS:: INDEPENDENT
ADLS_ACUITY_SCORE: 16

## 2021-04-26 ASSESSMENT — VISUAL ACUITY
OU: NORMAL ACUITY
OU: NORMAL ACUITY

## 2021-04-26 ASSESSMENT — MIFFLIN-ST. JEOR: SCORE: 1418.5

## 2021-04-26 NOTE — PLAN OF CARE
Major Shift Events:  Pt here for hemorrhagic stroke/aneurysm of cerebellum. Follows commands. Strong bilaterally. Pupils E/R. Dizziness. Intermittent nausea. Light sensitivity. Wears glasses at baseline. [place] baseline, hearing aids. Tracks. At 1700 RN concerned about worsening bleed d/t inability to track to L side upon waking, status improved 45 minutes later, repeat CT scan unchanged since previous. Uses urinal at bedside. Regular diet until 0000. On sliding scale insulin, BG 170s-200s. Constipated at baseline, oral laxatives given, no result. Arterial line placed by MD. Wife at bedside.  Plan: Angiogram tomorrow. Frequent neuro exams.  For vital signs and complete assessments, please see documentation flowsheets.

## 2021-04-26 NOTE — PROVIDER NOTIFICATION
Provider notified of SBP >140, after multiple PRN doses of IV antihypertensives (1445). Awaiting orders.

## 2021-04-26 NOTE — PLAN OF CARE
Problem: Sleep Disturbance (Delirium)  Goal: Improved Sleep  Outcome: Improving    Able to sleep adequately in-between neuro checks.     Problem: Attention and Thought Clarity Impairment (Delirium)  Goal: Improved Attention and Thought Clarity  Outcome: Improving

## 2021-04-26 NOTE — PROCEDURES
[facility]     Endovascular Surgical Neuroradiology Post-Procedure Note    Pre-Procedure Diagnosis:  Left cerebellar hemorrhage  Post-Procedure Diagnosis:  same    Procedure(s):   Diagnostic cervicocerebral angiography    Findings:  Normal cerebral angiogram, left vertebral artery direct origin off of arch, mild displacement of left PICA due to mass effect from superior vermian hemorrhage. No aneurysms or vascular malformations seen.    Plan:      - 2 hrs bedrest    Primary Surgeon:  Dr. Teofilo Ocampo  Fellow:  Sherri  Additional Assistants:  none    Prior to the start of the procedure and with procedural staff participation, I verbally confirmed: the patient s identity using two indicators, relevant allergies, that the procedure was appropriate and matched the consent or emergent situation, and that the correct equipment/implants were available. Immediately prior to starting the procedure I conducted the Time Out with the procedural staff and re-confirmed the patient s name, procedure, and site/side. (The Joint Commission universal protocol was followed.)  Yes    PRU value: Not applicable    Anesthesia:  Conscious Sedation  Medications:  Fentanyl, Midazolam  Puncture site:  Right Femoral Artery    Fluoroscopy time (minutes):  26  Radiation dose (mGy): 1697  Contrast amount (mL): 50    Estimated blood loss (mL):  minimal    Closure:  Device    Disposition:  Will be followed in hospital by the Neuro Critical Care/Stroke team.        Sedation Post-Procedure Summary    Sedatives: Fentanyl and Midazolam (Versed)    Vital signs and pulse oximetry were monitored and remained stable throughout the procedure, and sedation was maintained until the procedure was complete.  The patient was monitored by staff until sedation discharge criteria were met.    Patient tolerance:  Patient tolerated the procedure well with no immediate complications.    Time of sedation in  minutes: 52 minutes from beginning to end of physician one to one monitoring.    Live Phillips MD  Pager:  [phone]    See official report in PACS  LAKSHMI Ocampo MD

## 2021-04-26 NOTE — PROGRESS NOTES
04/26/21 1300   Quick Adds   Type of Visit Initial PT Evaluation   Living Environment   People in home spouse   Current Living Arrangements house   Home Accessibility stairs to enter home   Number of Stairs, Main Entrance 3   Stair Railings, Main Entrance railings on both sides of stairs  (wide steps so only able to use one rail)   Transportation Anticipated car, drives self;family or friend will provide   Living Environment Comments Patient lives with spouse in single story home [place]. 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   Self-Care   Usual Activity Tolerance good   Current Activity Tolerance fair   Regular Exercise Yes   Activity/Exercise Type walking   Equipment Currently Used at Home none   Activity/Exercise/Self-Care Comment Patient is IND with all mobility and self cares at baseline. Patient walks to the corner on gravel road ~1.4 miles, also walks on treadmill at home ~15-20 minutes at incline 6.5 - 7. Says he has had decreased energy and more fatigued over the past couple months.    Disability/Function   Hearing Difficulty or Deaf yes   Describe hearing loss bilateral hearing loss   Use of hearing assistive devices bilateral hearing aids   Wear Glasses or Blind yes   Vision Management glasses   Concentrating, Remembering or Making Decisions Difficulty no   Difficulty Communicating no   Difficulty Eating/Swallowing no   Walking or Climbing Stairs Difficulty no   Dressing/Bathing Difficulty no   Toileting issues no   Doing Errands Independently Difficulty (such as shopping) no   Fall history within last six months no   Change in Functional Status Since Onset of Current Illness/Injury yes   General Information   Onset of Illness/Injury or Date of Surgery 04/24/21   Referring Physician Twan Martinez MD   Patient/Family Therapy Goals Statement (PT) to return home   Pertinent History of Current Problem (include personal factors and/or comorbidities  that impact the POC) Ronen Small is a 85 year old male with dizziness found to have acute midline cerebellar intraparenchymal hemorrhage in the setting of hypertension.   Existing Precautions/Restrictions fall   General Observations Activity: up with assist   Cognition   Orientation Status (Cognition) oriented x 4   Affect/Mental Status (Cognition) WFL   Follows Commands (Cognition) follows one-step commands   Pain Assessment   Patient Currently in Pain No   Integumentary/Edema   Integumentary/Edema no deficits were identifed   Posture    Posture Protracted shoulders   Range of Motion (ROM)   ROM Comment BLE WFL   Strength   Strength Comments generalized deconditioning, BLE WFL   Bed Mobility   Comment (Bed Mobility) Not assessed, patient greeted and left sitting up in chair   Transfers   Transfer Safety Comments sit<>stand with min A   Gait/Stairs (Locomotion)   Comment (Gait/Stairs) ambulates with FWW and min A, WBOS, impaired balance and coordination with gait   Balance   Balance Comments impaired static and dynamic balance requiring FWW and min A for stability   Sensory Examination   Sensory Perception Comments baseline neuropathy in bilateral feet   Coordination   Coordination Comments FNF and heel-shin intact bilaterally   Clinical Impression   Criteria for Skilled Therapeutic Intervention yes, treatment indicated   PT Diagnosis (PT) impaired functional mobility   Influenced by the following impairments balance, coordination, dizziness, neuropathy, activity tolerance   Functional limitations due to impairments bed mobility, transfers, gait, stairs, functional endurance   Clinical Presentation Evolving/Changing   Clinical Presentation Rationale PMH/comorbidities, clinical judgement   Clinical Decision Making (Complexity) moderate complexity   Therapy Frequency (PT) 6x/week   Predicted Duration of Therapy Intervention (days/wks) 2 weeks   Planned Therapy Interventions (PT) balance training;bed mobility  training;gait training;patient/family education;strengthening;transfer training;neuromuscular re-education   Risk & Benefits of therapy have been explained evaluation/treatment results reviewed;care plan/treatment goals reviewed;risks/benefits reviewed;participants voiced agreement with care plan;participants included;patient;spouse/significant other   PT Discharge Planning    PT Discharge Recommendation (DC Rec) Acute Rehab Center-Motivated patient will benefit from intensive, interdisciplinary therapy.  Anticipate will be able to tolerate 3 hours of therapy per day   PT Rationale for DC Rec Patient below baseline mobility, will benefit from continued skilled therapy to progress balance, activity tolerance and functional independence.   Total Evaluation Time   Total Evaluation Time (Minutes) 5

## 2021-04-26 NOTE — CONSULTS
04/26/21 1201 Vivian Hooks RN     Patient states he has been feeling depressed the last two months. RN notified of patients comments.     04/26/21 1200 Vivian Hooks RN     Stroke Education Note     The following information has been reviewed with the patient and family:     1. Warning signs of stroke     2. Calling 911 if having warning signs of stroke     3. All modifiable risk factors: hypertension, CAD, atrial fib, diabetes, hypercholesterolemia, smoking, substance abuse, diet, physical inactivity, obesity, sleep apnea.     4. Patient's risk factors for stroke which include: HTN, HLD, DM,sleep apnea     5. Follow-up plan for after discharge     6. Discharge medications which include: HTN, HLD     In addition, the [facility] Stroke Class Handout has been given to the patient and family.     Learner's response to risk factors / lifestyle modification education: Ability to change Reasons to change Need to change      Vivian Hooks RN, RN

## 2021-04-26 NOTE — PROGRESS NOTES
ICU End of Shift Summary. See flowsheets for vital signs and detailed assessment.    Changes this shift: Remains alert and oriented, able to make needs to known. Equal strength throughout, no drift. Slight left sided facial droop. Minimal report of dizziness overnight, no nausea or vomiting. Some continued light sensitivity, pt reports that happens at baseline at home. Cardene titrated off overnight, PRN's given for SBP >140. Arterial line removed overnight due to continuous oozing/bleeding from site. Provider at bedside and given the okay to remove line and use cuff pressures. Voiding into urinal without issue, no BM overnight.      Plan: Go to IR today, replace electrolytes, continue monitoring neuro status.

## 2021-04-26 NOTE — PROGRESS NOTES
Care Management Initial Consult    General Information  Assessment completed with: Patient, Spouse or significant other, (Pt and spouse)  Type of CM/SW Visit: Initial Assessment    Primary Care Provider verified and updated as needed: Yes   Readmission within the last 30 days: unable to assess         Advance Care Planning: Advance Care Planning Reviewed: no concerns identified, questions answered, education/resources on health care directives provided          Communication Assessment  Patient's communication style: spoken language (English or Bilingual)    Hearing Difficulty or Deaf: yes   Wear Glasses or Blind: yes    Cognitive  Cognitive/Neuro/Behavioral: WDL  Level of Consciousness: alert  Arousal Level: opens eyes spontaneously  Orientation: oriented x 4  Mood/Behavior: calm, cooperative  Best Language: 0 - No aphasia  Speech: clear    Living Environment:   People in home: spouse     Current living Arrangements: house      Able to return to prior arrangements:         Family/Social Support:  Care provided by: self  Provides care for: no one  Marital Status:   Wife, Children          Description of Support System: Supportive    Support Assessment: Adequate social supports    Current Resources:   Patient receiving home care services:       Community Resources:    Equipment currently used at home: none  Supplies currently used at home:      Employment/Financial:  Employment Status: retired        Financial Concerns: No concerns identified           Lifestyle & Psychosocial Needs:        Socioeconomic History     Marital status:      Spouse name: Not on file     Number of children: Not on file     Years of education: Not on file     Highest education level: Not on file          Functional Status:  Prior to admission patient needed assistance:   Dependent ADLs:: Independent  Dependent IADLs:: Independent  Assesssment of Functional Status: Not at baseline with mobility    Mental Health  Status:  Mental Health Status: Current Concern  Mental Health Management: (hx of depression)    Chemical Dependency Status:  Chemical Dependency Status: No Current Concerns             Values/Beliefs:  Spiritual, Cultural Beliefs, Jainism Practices, Values that affect care: yes       Cultural/Jainism Practices Patient Routinely Participates In: ceremony, prayer  Values/Beliefs Comment: (Brodie- enjoys chaplian visits )    Additional Information:  Ronen Small is a 85 year old year old with PMH of HTN, KAPIL and DM who was admitted for ICH.    JOSE met with Pt and spouse Tracey at the bedside. SW introduced self and explained role. [place]. Pt and spouse have three children. Pt has a large support network. Pt identifies as Roman Catholic. Pt and spouse disclosed their daughter passed four years ago which has been difficult on the family. We explored feelings of grief and meaning making in the grief they are experiencing. Pt reported that from losing his daughter and the isolation caused by the current pandemic, he is experiencing feelings of depressed. SW reported she will discuss a psych consult with the team. JOSE noted that outpatient psychotherapy and medications will be useful long term tools and offered support to help find any of these resources.    JOSE posed the current PT/OT rec of [facility]. This plan may change as Pt was just assessed for the first time. Pt would like to be closer to home. Pt disclosed his first choice for any rehab would be [facility]- where his doctors are located. Pt other choices would be in [place], [place] and [place], [place]. JOSE noted referrals will be made when a plan is established and Pt is closer to discharge.    1. [facility]  Rehabilitation center  [address]   [phone]    NURYS Goodman, [facility]  ICU    [facility]  Phone: [phone]  Pager:  [phone]    Yeimy Ybarra LGSW

## 2021-04-26 NOTE — PLAN OF CARE
OT 4A: Cancel.  Acknowledge order placed for OT eval and treat. Pt gone to IR this AM with anticipated 2 to 6 hours of bedrest upon return.  Will evaluate once appropriate to advance activity.

## 2021-04-26 NOTE — PROGRESS NOTES
[facility]  NeuroCritical Care Progress Note    Patient Name:  Ronen Small  MRN:  [id number]    :  1935  Date of Service:  2021  Primary care provider:  Adi Mariano        24 HOUR EVENTS:  No events overnight. Angio today. Wean cardene.     ASSESSMENT/PLAN:  Ronen Small is a 85 year old admitted on 2021 with acute cerebellar hemorrhage. SBPs at OSH > 200's. Requiring Nicardipine gtt. CTA with 3 mm vascular outpouching medial to distal left PCA, possibly a small aneurysm versus a venous structure.       NEURO:  #Acute cerebellar hemorrhage ICH 2  -No aneurysm seen on angio  -sbp <140  -TCD's  -HOB>30  -Keppra 500q 12  -Hold PTA ASA  -PT/OT        CARDIO:  #HTN  -SBP < 140  -wean simone gtt  -PRN's  -PTA norvasc 5mg daily  -PTA coreg increased to 50mg BID      PULM:  #KAPIL  -room air  -O2>92      GI:  #GERD  - Bowel regimen: senna miralax  -Diet ordered  - Last BM: PTA  - GI ppx: Protonix 40mg      RENAL:  #Nocturia  -finasteride 5mg daily  -mag ox 400mg PO  -NAK 1 packet BID  -Daily BMP  -electrolyte replacement      ENDO:  #DMII  #Hyperglycemia  -high dose SS  - Follow glucose levels    HEME:  #ERWIN  - Daily CBC      ID:  #ERWIN  -Daily CBC      Checklist:  FEN: none, replacement, regular  LDA: PIV  PPx:   - DVT: SCD's  - GI: protonix  Code: full    Dispo: ICU      Patient discussed with Attending Dr. Francisco .    Tani Orta DNP  Ascom: [id number]  2021      ______________________________    24 HOUR VITAL SIGNS SUMMARY:   Temperatures:  Current - Temp: 97.6  F (36.4  C); Max - Temp  Av.1  F (36.7  C)  Min: 97.6  F (36.4  C)  Max: 98.6  F (37  C)  Respiration range: Resp  Av.7  Min: 8  Max: 18  Pulse range: Pulse  Av.6  Min: 53  Max: 78  Blood pressure range: Systolic (24hrs), Av , Min:118 , Max:166   ; Diastolic (24hrs), Av, Min:55, Max:94    Pulse oximetry range: SpO2  Av.6 %  Min: 90 %  Max: 99 %    VENTILATOR  "SETTINGS:  Resp: 12        Intake/Output Summary (Last 24 hours) at 4/26/2021 1627  Last data filed at 4/26/2021 1600  Gross per 24 hour   Intake 2579.66 ml   Output 1175 ml   Net 1404.66 ml       Arterial Line BP: ()/(41-63) 157/60  MAP:  [65 mmHg-92 mmHg] 91 mmHg  BP - Mean:  [] 102    PHYSICAL EXAMINATION:   BP (!) 147/75 (BP Location: Right arm)   Pulse 58   Temp 97.6  F (36.4  C) (Oral)   Resp 12   Ht 1.727 m (5' 8\")   Wt 75.4 kg (166 lb 3.6 oz)   SpO2 95%   BMI 25.27 kg/m          General: Awake and alert, Lying in bed, NAD, cooperative  HEENT: Normocephalic, atraumatic, no epistaxis, no oral lesions, MMM  Resp: CTAB, no increased work of breathing  Cardio: RRR  Abdomen: +BS, Soft, non-distended, non-tender  Extremities: Warm and well perfused, peripheral pulses present, no edema  Skin: Not jaundiced, no rash, no ecchymoses  Psych: Normal mood and affect    Neuro:  Mental status: Awake, alert, attentive; oriented to P/P/T/S  Speech: Fluent, comprehension and repetition intact; No dysarthria  Cranial nerves: Visual fields intact, Eyes conjugate, PERRLA, EOMI w/ normal and smooth pursuit, face expression symmetric, facial sensation intact and symmetric, hearing intact to conversation, shoulder shrug strong, palate rise symmetric, tongue/uvula midline.  Motor: Tone normal. 5/5 strength in x4E. No atrophy or twitches. No Pronator drift present. Finger tapping symmetric. Rolling hands normal rate and coordination  Sensory: Intact to LT, PP x4E; No lateral extinction   Coordination: Ataxia, impaired gait.   Gait: impaired gait. Needed walker with assistance.       CURRENT MEDICATIONS:    amLODIPine  5 mg Oral or Feeding Tube Daily     finasteride  5 mg Oral or Feeding Tube Daily     insulin aspart  1-12 Units Subcutaneous Q4H     levETIRAcetam  500 mg Oral or Feeding Tube BID     losartan  25 mg Oral or Feeding Tube BID     niMODipine  60 mg Oral Q4H MARGARET     pantoprazole  40 mg Oral QAM AC     " polyethylene glycol  17 g Oral or Feeding Tube Daily     senna-docusate  1 tablet Oral or Feeding Tube BID       PRN MEDICATIONS:  acetaminophen, glucose **OR** dextrose **OR** glucagon, hydrALAZINE, labetalol, ondansetron **OR** ondansetron, prochlorperazine **OR** prochlorperazine **OR** prochlorperazine    INFUSIONS:      ALLERGIES:  Allergies   Allergen Reactions     Atorvastatin Other (See Comments)     Myalgias     Fluoxetine Other (See Comments)     Hypersomnolence     Indomethacin Other (See Comments)     Hypersomnolence         LABS/STUDIES:  Recent Labs   Lab Test 04/26/21  0407 04/25/21  1343 04/25/21  0402 04/24/21  2237     --  133 133   POTASSIUM 3.7 3.3* 3.4 3.5   CHLORIDE 104  --  98 99   CO2 26  --  27 26   ANIONGAP 7  --  7 8   *  --  218* 260*   BUN 17  --  19 18   CR 0.89  --  0.75 0.79   PRABHU 8.0*  --  8.5 8.6   WBC 9.1  --  10.2 14.8*   RBC 3.84*  --  4.03* 4.22*   HGB 11.2*  --  11.6* 12.4*     --  258 249       Recent Labs   Lab Test 04/24/21  2237   INR 1.03       No lab results found in last 7 days.      I have personally reviewed all labs and imaging for this patient.

## 2021-04-26 NOTE — PLAN OF CARE
Major Shift Events:  Mr. Small proceeded to IR this morning and tolerated it well. Neuro checks remain intact with no deficits noted, but he is dizzy and unsteady with ambulation. Groin site CDI. Ambulated in the villarreal x2. Adequate UOP.  Multiple doses of PRN hydralazine and labetalol given to maintain SBP <140. Team was notified and pt received one time dose of Cozaar. Nicardipine restarted.     Plan: Maintain SBP <140 and monitor IR groin site. Notify primary team of changes, continue with POC.     For vital signs and complete assessments, please see documentation flowsheets.        Problem: Adjustment to Illness (Delirium)  Goal: Optimal Coping  Outcome: No Change     Problem: Altered Behavior (Delirium)  Goal: Improved Behavioral Control  Outcome: No Change     Problem: Attention and Thought Clarity Impairment (Delirium)  Goal: Improved Attention and Thought Clarity  Outcome: No Change     Problem: Sleep Disturbance (Delirium)  Goal: Improved Sleep  Outcome: No Change     Problem: Adult Inpatient Plan of Care  Goal: Plan of Care Review  Outcome: No Change  Goal: Patient-Specific Goal (Individualized)  Outcome: No Change  Goal: Absence of Hospital-Acquired Illness or Injury  Outcome: No Change  Intervention: Identify and Manage Fall Risk  Recent Flowsheet Documentation  Taken 4/26/2021 1600 by Hallie Snider, RN  Safety Promotion/Fall Prevention:    activity supervised    bed alarm on    clutter free environment maintained    fall prevention program maintained    increased rounding and observation    increase visualization of patient    room door open    room near nurse's station    room organization consistent  Taken 4/26/2021 1200 by Hallie Snider, RN  Safety Promotion/Fall Prevention:    activity supervised    bed alarm on    clutter free environment maintained    fall prevention program maintained    increased rounding and observation    increase visualization of patient    room door open    room near  nurse's station    room organization consistent  Taken 4/26/2021 0805 by Hallie Snider RN  Safety Promotion/Fall Prevention:    activity supervised    bed alarm on    clutter free environment maintained    fall prevention program maintained    increased rounding and observation    increase visualization of patient    room door open    room near nurse's station    room organization consistent  Intervention: Prevent Skin Injury  Recent Flowsheet Documentation  Taken 4/26/2021 1600 by Hallie Snider RN  Body Position: position changed independently  Taken 4/26/2021 1200 by Hallie Snider RN  Body Position: weight shifting  Taken 4/26/2021 0805 by Hallie Snider RN  Body Position: position changed independently  Taken 4/26/2021 0730 by Hallie Snider RN  Body Position: position changed independently  Intervention: Prevent and Manage VTE (Venous Thromboembolism) Risk  Recent Flowsheet Documentation  Taken 4/26/2021 1600 by Hallie Snider RN  VTE Prevention/Management: pneumatic compression device  Taken 4/26/2021 1200 by Hallie Snider RN  VTE Prevention/Management: pneumatic compression device  Taken 4/26/2021 0805 by Hallie Snider RN  VTE Prevention/Management: pneumatic compression device  Goal: Optimal Comfort and Wellbeing  Outcome: No Change  Goal: Readiness for Transition of Care  Outcome: No Change     Problem: Adjustment to Illness (Stroke, Hemorrhagic)  Goal: Optimal Coping  Outcome: No Change     Problem: Bowel Elimination Impaired (Stroke, Hemorrhagic)  Goal: Effective Bowel Elimination  Outcome: No Change     Problem: Cerebral Tissue Perfusion (Stroke, Hemorrhagic)  Goal: Optimal Cerebral Tissue Perfusion  Outcome: No Change     Problem: Cognitive Impairment (Stroke, Hemorrhagic)  Goal: Optimal Cognitive Function  Outcome: No Change     Problem: Communication Impairment (Stroke, Hemorrhagic)  Goal: Effective Communication Skills  Outcome: No Change     Problem: Functional Ability Impaired  (Stroke, Hemorrhagic)  Goal: Optimal Functional Ability  Outcome: No Change  Intervention: Optimize Functional Ability  Recent Flowsheet Documentation  Taken 4/26/2021 1600 by Hallie Snider RN  Activity Management: ambulated in villarreal  Taken 4/26/2021 1200 by Hallie Snider RN  Activity Management: activity adjusted per tolerance  Taken 4/26/2021 0805 by Hallie Snider RN  Activity Management: activity adjusted per tolerance  Taken 4/26/2021 0730 by Hallie Snider RN  Activity Management: activity adjusted per tolerance     Problem: Pain (Stroke, Hemorrhagic)  Goal: Acceptable Pain Control  Outcome: No Change  Intervention: Monitor and Manage Pain  Recent Flowsheet Documentation  Taken 4/26/2021 1200 by Hallie Snider RN  Pain Management Interventions: rest  Taken 4/26/2021 0730 by Hallie Snider RN  Pain Management Interventions:    rest    repositioned     Problem: Sensorimotor Impairment (Stroke, Hemorrhagic)  Goal: Improved Sensorimotor Function  Outcome: No Change  Intervention: Optimize Range of Motion, Motor Control and Function  Recent Flowsheet Documentation  Taken 4/26/2021 1600 by Hallie Snider RN  Positioning/Transfer Devices:    pillows    in use  Taken 4/26/2021 1200 by Hallie Snider RN  Positioning/Transfer Devices:    pillows    in use  Taken 4/26/2021 0805 by Hallie Snider RN  Positioning/Transfer Devices:    pillows    in use  Taken 4/26/2021 0730 by Hallie Snider RN  Positioning/Transfer Devices:    pillows    in use     Problem: Swallowing Impairment (Stroke, Hemorrhagic)  Goal: Oral Intake without Aspiration  Outcome: No Change     Problem: Urinary Elimination Impaired (Stroke, Hemorrhagic)  Goal: Effective Urinary Elimination  Outcome: No Change     Problem: Discharge Planning  Goal: Discharge Planning (Adult, OB, Behavioral, Peds)  Outcome: No Change

## 2021-04-26 NOTE — PROGRESS NOTES
[facility]     Endovascular Surgical Neuroradiology Pre-Procedure Note      HPI:  Ronen Small is a 85 year old male with dizziness found to have acute midline cerebellar intraparenchymal hemorrhage in the setting of hypertension. Vessel imaging in CT angiogram showed a small outpouching near left posterior cerebral artery concerning for possible aneurysm versus other vascular malformation. He remains neurologically intact. Neuro endovascular service consulted for formal cerebral angiogram and possible aneurysm treatment. Consent obtained from patient and his wife.    Medical History:  No past medical history on file.    Surgical History:  No past surgical history on file.    Family History:  No family history on file.    Social History:  Social History     Socioeconomic History     Marital status:      Spouse name: Not on file     Number of children: Not on file     Years of education: Not on file     Highest education level: Not on file   Occupational History     Not on file   Social Needs     Financial resource strain: Not on file     Food insecurity     Worry: Not on file     Inability: Not on file     Transportation needs     Medical: Not on file     Non-medical: Not on file   Tobacco Use     Smoking status: Not on file   Substance and Sexual Activity     Alcohol use: Not on file     Drug use: Not on file     Sexual activity: Not on file   Lifestyle     Physical activity     Days per week: Not on file     Minutes per session: Not on file     Stress: Not on file   Relationships     Social connections     Talks on phone: Not on file     Gets together: Not on file     Attends Yazidi service: Not on file     Active member of club or organization: Not on file     Attends meetings of clubs or organizations: Not on file     Relationship status: Not on file     Intimate partner violence     Fear of current or ex partner: Not on file     Emotionally abused:  Not on file     Physically abused: Not on file     Forced sexual activity: Not on file   Other Topics Concern     Not on file   Social History Narrative     Not on file       Allergies:  Allergies   Allergen Reactions     Atorvastatin Other (See Comments)     Myalgias     Fluoxetine Other (See Comments)     Hypersomnolence     Indomethacin Other (See Comments)     Hypersomnolence       Is there a contrast allergy?  No    Medications:  Medications Prior to Admission   Medication Sig Dispense Refill Last Dose     albuterol (PROAIR HFA/PROVENTIL HFA/VENTOLIN HFA) 108 (90 Base) MCG/ACT inhaler Inhale 2 puffs into the lungs every 4 hours as needed        amLODIPine (NORVASC) 5 MG tablet Take 5 mg by mouth daily In evening        azelastine (ASTELIN) 0.1 % nasal spray Spray 1 spray in nostril 2 times daily        finasteride (PROSCAR) 5 MG tablet Take 5 mg by mouth daily        losartan (COZAAR) 25 MG tablet Take 25 mg by mouth 2 times daily        metFORMIN (GLUCOPHAGE) 1000 MG tablet Take 1,000 mg by mouth 2 times daily (with meals)        omeprazole (PRILOSEC) 40 MG DR capsule Take 40 mg by mouth daily        pantoprazole (PROTONIX) 40 MG EC tablet Take 40 mg by mouth daily        pravastatin (PRAVACHOL) 20 MG tablet Take 20 mg by mouth daily        terazosin (HYTRIN) 10 MG capsule Take 10 mg by mouth At Bedtime        aspirin (ASA) 81 MG EC tablet Take 81 mg by mouth daily        cholecalciferol 25 MCG (1000 UT) TABS Take 1,000 Units by mouth daily        docusate sodium (DSS) 100 MG capsule Take 200 mg by mouth daily With supper        FERROUS FUMARATE PO Take 1 tablet by mouth daily        glimepiride (AMARYL) 1 MG tablet Take 1 mg by mouth 2 times daily (with meals)        ibuprofen (ADVIL/MOTRIN) 200 MG tablet Take 400 mg by mouth every 4 hours as needed        Multiple Vitamin (THERA-TABS) TABS Take 1 tablet by mouth daily        polyethylene glycol (MIRALAX) 17 GM/Dose powder Take 17 g by mouth daily         triamcinolone (NASACORT ALLERGY 24HR) 55 MCG/ACT nasal aerosol Spray 1 spray in nostril daily        vitamin C (ASCORBIC ACID) 500 MG tablet Take 500 mg by mouth daily      .    ROS:  The 10 point Review of Systems is negative other than noted in the HPI or here.     PHYSICAL EXAMINATION  Vital Signs:  B/P: 137/69,  T: 98.6,  P: 62,  R: 15    Rrr, lungs clear bilaterally, abdomen soft/nt/nd  aao X 3, no facial droop, no pronator drift all 4 ext 5/5     LABS  (most recent Cr, BUN, GFR, PLT, INR, PTT within the past 7 days):  Recent Labs   Lab 04/26/21  0407 04/24/21  2237 04/24/21  2237   CR 0.89   < > 0.79   BUN 17   < > 18   GFRESTIMATED 78   < > 82   GFRESTBLACK >90   < > >90      < > 249   INR  --   --  1.03    < > = values in this interval not displayed.           A/P: Proceed with angiogram with possible aneurysm treatment.          PRE-PROCEDURE SEDATION ASSESSMENT     Pre-Procedure Sedation Assessment done at 0800.    Expected Level:  Moderate Sedation    Indication:  Sedation is required to allow for neurointerventional procedure.    Consent obtained from patient and spouse after discussing the risks, benefits and alternatives.     PO Intake:  Appropriately NPO for procedure    ASA Class:  Class 2 - MILD SYSTEMIC DISEASE, NO ACUTE PROBLEMS, NO FUNCTIONAL LIMITATIONS.    Mallampati:  Grade 2:  Soft palate, base of uvula, tonsillar pillars, and portion of posterior pharyngeal wall visible    History and physical reviewed and no updates needed. I have reviewed the lab findings, diagnostic data, medications, and the plan for sedation. I have determined this patient to be an appropriate candidate for the planned sedation and procedure and have reassessed the patient IMMEDIATELY PRIOR to sedation and procedure.    Patient was discussed with the Attending, Dr. Ocampo, who agrees with the plan.    Daria Boo MD  Neuroendovascular Fellow  Pager: [phone]  04/26/2021 8:04 AM      I have reviewed the history. I have  seen and examined the patient myself and agree with the assessment and plan above.  LAKSHMI Ocampo MD

## 2021-04-26 NOTE — PROGRESS NOTES
Patient Name: Ronen Small  Medical Record Number: [id number]  Today's Date: 4/26/2021    Procedure: Diagnostic cerebral angiogram   Proceduralist: Dr. Ocampo, Dr. Boo, Dr. Phillips    Procedure Start: 0834  Procedure end: 0923  Sedation medications administered: 100 mcg fentanyl, 1.5 mg versed     Report given to: Hallie VAUGHN 4A  : ABIODUN    Other Notes: Pt arrived to IR room 3 from . Consent reviewed. Pt denies any questions or concerns regarding procedure. Pt positioned supine and monitored per protocol. Pt tolerated procedure without any noted complications. Pt transferred back to .    AngioSeal (LOT [id number]) closure device deployed to right groin at 0920. Bedrest x 2 hours until 1120.

## 2021-04-27 ENCOUNTER — APPOINTMENT (OUTPATIENT)
Dept: PHYSICAL THERAPY | Facility: CLINIC | Age: 86
DRG: 064 | End: 2021-04-27
Attending: PSYCHIATRY & NEUROLOGY
Payer: MEDICARE

## 2021-04-27 ENCOUNTER — APPOINTMENT (OUTPATIENT)
Dept: OCCUPATIONAL THERAPY | Facility: CLINIC | Age: 86
DRG: 064 | End: 2021-04-27
Attending: STUDENT IN AN ORGANIZED HEALTH CARE EDUCATION/TRAINING PROGRAM
Payer: MEDICARE

## 2021-04-27 LAB
ANION GAP SERPL CALCULATED.3IONS-SCNC: 6 MMOL/L (ref 3–14)
BUN SERPL-MCNC: 17 MG/DL (ref 7–30)
CALCIUM SERPL-MCNC: 8.3 MG/DL (ref 8.5–10.1)
CHLORIDE SERPL-SCNC: 100 MMOL/L (ref 94–109)
CO2 SERPL-SCNC: 24 MMOL/L (ref 20–32)
CREAT SERPL-MCNC: 0.85 MG/DL (ref 0.66–1.25)
ERYTHROCYTE [DISTWIDTH] IN BLOOD BY AUTOMATED COUNT: 13.7 % (ref 10–15)
GFR SERPL CREATININE-BSD FRML MDRD: 80 ML/MIN/{1.73_M2}
GLUCOSE BLDC GLUCOMTR-MCNC: 167 MG/DL (ref 70–99)
GLUCOSE BLDC GLUCOMTR-MCNC: 180 MG/DL (ref 70–99)
GLUCOSE BLDC GLUCOMTR-MCNC: 182 MG/DL (ref 70–99)
GLUCOSE BLDC GLUCOMTR-MCNC: 190 MG/DL (ref 70–99)
GLUCOSE BLDC GLUCOMTR-MCNC: 206 MG/DL (ref 70–99)
GLUCOSE BLDC GLUCOMTR-MCNC: 231 MG/DL (ref 70–99)
GLUCOSE SERPL-MCNC: 175 MG/DL (ref 70–99)
HBA1C MFR BLD: 7.4 % (ref 0–5.6)
HCT VFR BLD AUTO: 36.6 % (ref 40–53)
HGB BLD-MCNC: 12.4 G/DL (ref 13.3–17.7)
MAGNESIUM SERPL-MCNC: 2.3 MG/DL (ref 1.6–2.3)
MCH RBC QN AUTO: 30.2 PG (ref 26.5–33)
MCHC RBC AUTO-ENTMCNC: 33.9 G/DL (ref 31.5–36.5)
MCV RBC AUTO: 89 FL (ref 78–100)
PHOSPHATE SERPL-MCNC: 3.5 MG/DL (ref 2.5–4.5)
PLATELET # BLD AUTO: 228 10E9/L (ref 150–450)
POTASSIUM SERPL-SCNC: 3.4 MMOL/L (ref 3.4–5.3)
RBC # BLD AUTO: 4.11 10E12/L (ref 4.4–5.9)
SODIUM SERPL-SCNC: 130 MMOL/L (ref 133–144)
WBC # BLD AUTO: 10.7 10E9/L (ref 4–11)

## 2021-04-27 PROCEDURE — 250N000013 HC RX MED GY IP 250 OP 250 PS 637: Performed by: STUDENT IN AN ORGANIZED HEALTH CARE EDUCATION/TRAINING PROGRAM

## 2021-04-27 PROCEDURE — 85027 COMPLETE CBC AUTOMATED: CPT | Performed by: STUDENT IN AN ORGANIZED HEALTH CARE EDUCATION/TRAINING PROGRAM

## 2021-04-27 PROCEDURE — 83036 HEMOGLOBIN GLYCOSYLATED A1C: CPT | Performed by: STUDENT IN AN ORGANIZED HEALTH CARE EDUCATION/TRAINING PROGRAM

## 2021-04-27 PROCEDURE — 97530 THERAPEUTIC ACTIVITIES: CPT | Mod: GP

## 2021-04-27 PROCEDURE — 250N000013 HC RX MED GY IP 250 OP 250 PS 637: Performed by: NURSE PRACTITIONER

## 2021-04-27 PROCEDURE — 999N001017 HC STATISTIC GLUCOSE BY METER IP

## 2021-04-27 PROCEDURE — 250N000013 HC RX MED GY IP 250 OP 250 PS 637: Performed by: PSYCHIATRY & NEUROLOGY

## 2021-04-27 PROCEDURE — 84100 ASSAY OF PHOSPHORUS: CPT | Performed by: STUDENT IN AN ORGANIZED HEALTH CARE EDUCATION/TRAINING PROGRAM

## 2021-04-27 PROCEDURE — 999N000128 HC STATISTIC PERIPHERAL IV START W/O US GUIDANCE

## 2021-04-27 PROCEDURE — 97535 SELF CARE MNGMENT TRAINING: CPT | Mod: GO

## 2021-04-27 PROCEDURE — 97165 OT EVAL LOW COMPLEX 30 MIN: CPT | Mod: GO

## 2021-04-27 PROCEDURE — 250N000009 HC RX 250: Performed by: STUDENT IN AN ORGANIZED HEALTH CARE EDUCATION/TRAINING PROGRAM

## 2021-04-27 PROCEDURE — 200N000002 HC R&B ICU UMMC

## 2021-04-27 PROCEDURE — 99291 CRITICAL CARE FIRST HOUR: CPT | Performed by: PSYCHIATRY & NEUROLOGY

## 2021-04-27 PROCEDURE — 97116 GAIT TRAINING THERAPY: CPT | Mod: GP

## 2021-04-27 PROCEDURE — 250N000011 HC RX IP 250 OP 636: Performed by: STUDENT IN AN ORGANIZED HEALTH CARE EDUCATION/TRAINING PROGRAM

## 2021-04-27 PROCEDURE — 83735 ASSAY OF MAGNESIUM: CPT | Performed by: STUDENT IN AN ORGANIZED HEALTH CARE EDUCATION/TRAINING PROGRAM

## 2021-04-27 PROCEDURE — 97530 THERAPEUTIC ACTIVITIES: CPT | Mod: GO

## 2021-04-27 PROCEDURE — 36415 COLL VENOUS BLD VENIPUNCTURE: CPT | Performed by: STUDENT IN AN ORGANIZED HEALTH CARE EDUCATION/TRAINING PROGRAM

## 2021-04-27 PROCEDURE — 80048 BASIC METABOLIC PNL TOTAL CA: CPT | Performed by: STUDENT IN AN ORGANIZED HEALTH CARE EDUCATION/TRAINING PROGRAM

## 2021-04-27 RX ORDER — AMLODIPINE BESYLATE 5 MG/1
5 TABLET ORAL ONCE
Status: COMPLETED | OUTPATIENT
Start: 2021-04-27 | End: 2021-04-27

## 2021-04-27 RX ORDER — AMOXICILLIN 250 MG
2 CAPSULE ORAL 2 TIMES DAILY
Status: DISCONTINUED | OUTPATIENT
Start: 2021-04-27 | End: 2021-05-02 | Stop reason: HOSPADM

## 2021-04-27 RX ORDER — HEPARIN SODIUM 5000 [USP'U]/.5ML
5000 INJECTION, SOLUTION INTRAVENOUS; SUBCUTANEOUS EVERY 8 HOURS
Status: DISCONTINUED | OUTPATIENT
Start: 2021-04-27 | End: 2021-05-02 | Stop reason: HOSPADM

## 2021-04-27 RX ORDER — DEXTROSE MONOHYDRATE 25 G/50ML
25-50 INJECTION, SOLUTION INTRAVENOUS
Status: DISCONTINUED | OUTPATIENT
Start: 2021-04-27 | End: 2021-05-02 | Stop reason: HOSPADM

## 2021-04-27 RX ORDER — LOSARTAN POTASSIUM 25 MG/1
25 TABLET ORAL ONCE
Status: COMPLETED | OUTPATIENT
Start: 2021-04-27 | End: 2021-04-27

## 2021-04-27 RX ORDER — AMLODIPINE BESYLATE 10 MG/1
10 TABLET ORAL DAILY
Status: DISCONTINUED | OUTPATIENT
Start: 2021-04-28 | End: 2021-05-02 | Stop reason: HOSPADM

## 2021-04-27 RX ORDER — PRAVASTATIN SODIUM 10 MG
20 TABLET ORAL
Status: DISCONTINUED | OUTPATIENT
Start: 2021-04-27 | End: 2021-05-02 | Stop reason: HOSPADM

## 2021-04-27 RX ORDER — POTASSIUM CHLORIDE 750 MG/1
40 TABLET, EXTENDED RELEASE ORAL ONCE
Status: COMPLETED | OUTPATIENT
Start: 2021-04-27 | End: 2021-04-27

## 2021-04-27 RX ORDER — NICOTINE POLACRILEX 4 MG
15-30 LOZENGE BUCCAL
Status: DISCONTINUED | OUTPATIENT
Start: 2021-04-27 | End: 2021-05-02 | Stop reason: HOSPADM

## 2021-04-27 RX ORDER — LOSARTAN POTASSIUM 50 MG/1
50 TABLET ORAL 2 TIMES DAILY
Status: DISCONTINUED | OUTPATIENT
Start: 2021-04-27 | End: 2021-04-30

## 2021-04-27 RX ADMIN — NIMODIPINE 60 MG: 30 CAPSULE, LIQUID FILLED ORAL at 04:23

## 2021-04-27 RX ADMIN — HEPARIN SODIUM 5000 UNITS: 5000 INJECTION, SOLUTION INTRAVENOUS; SUBCUTANEOUS at 12:23

## 2021-04-27 RX ADMIN — DOCUSATE SODIUM 50 MG AND SENNOSIDES 8.6 MG 1 TABLET: 8.6; 5 TABLET, FILM COATED ORAL at 07:38

## 2021-04-27 RX ADMIN — LOSARTAN POTASSIUM 25 MG: 25 TABLET, FILM COATED ORAL at 12:22

## 2021-04-27 RX ADMIN — POLYETHYLENE GLYCOL 3350 17 G: 17 POWDER, FOR SOLUTION ORAL at 07:38

## 2021-04-27 RX ADMIN — NIMODIPINE 60 MG: 30 CAPSULE, LIQUID FILLED ORAL at 07:38

## 2021-04-27 RX ADMIN — LOSARTAN POTASSIUM 50 MG: 50 TABLET, FILM COATED ORAL at 19:50

## 2021-04-27 RX ADMIN — FINASTERIDE 5 MG: 5 TABLET, FILM COATED ORAL at 07:38

## 2021-04-27 RX ADMIN — DOCUSATE SODIUM 50 MG AND SENNOSIDES 8.6 MG 2 TABLET: 8.6; 5 TABLET, FILM COATED ORAL at 19:50

## 2021-04-27 RX ADMIN — POTASSIUM CHLORIDE 40 MEQ: 750 TABLET, EXTENDED RELEASE ORAL at 12:22

## 2021-04-27 RX ADMIN — PRAVASTATIN SODIUM 20 MG: 10 TABLET ORAL at 19:50

## 2021-04-27 RX ADMIN — NIMODIPINE 60 MG: 30 CAPSULE, LIQUID FILLED ORAL at 00:18

## 2021-04-27 RX ADMIN — LABETALOL HYDROCHLORIDE 10 MG: 5 INJECTION, SOLUTION INTRAVENOUS at 08:38

## 2021-04-27 RX ADMIN — NICARDIPINE HYDROCHLORIDE 12.5 MG/HR: 0.2 INJECTION, SOLUTION INTRAVENOUS at 04:23

## 2021-04-27 RX ADMIN — LEVETIRACETAM 500 MG: 500 TABLET, FILM COATED ORAL at 10:08

## 2021-04-27 RX ADMIN — HEPARIN SODIUM 5000 UNITS: 5000 INJECTION, SOLUTION INTRAVENOUS; SUBCUTANEOUS at 19:54

## 2021-04-27 RX ADMIN — NICARDIPINE HYDROCHLORIDE 7.5 MG/HR: 0.2 INJECTION, SOLUTION INTRAVENOUS at 00:35

## 2021-04-27 RX ADMIN — PANTOPRAZOLE SODIUM 40 MG: 40 TABLET, DELAYED RELEASE ORAL at 07:38

## 2021-04-27 RX ADMIN — LOSARTAN POTASSIUM 25 MG: 25 TABLET, FILM COATED ORAL at 07:38

## 2021-04-27 RX ADMIN — NICARDIPINE HYDROCHLORIDE 7.5 MG/HR: 0.2 INJECTION, SOLUTION INTRAVENOUS at 08:40

## 2021-04-27 RX ADMIN — AMLODIPINE BESYLATE 5 MG: 5 TABLET ORAL at 17:08

## 2021-04-27 RX ADMIN — HYDRALAZINE HYDROCHLORIDE 20 MG: 20 INJECTION, SOLUTION INTRAMUSCULAR; INTRAVENOUS at 10:08

## 2021-04-27 RX ADMIN — AMLODIPINE BESYLATE 5 MG: 5 TABLET ORAL at 07:38

## 2021-04-27 ASSESSMENT — ACTIVITIES OF DAILY LIVING (ADL)
ADLS_ACUITY_SCORE: 17
ADLS_ACUITY_SCORE: 17
PREVIOUS_RESPONSIBILITIES: MEAL PREP;HOUSEKEEPING;LAUNDRY;SHOPPING;YARDWORK;MEDICATION MANAGEMENT;FINANCES;DRIVING
ADLS_ACUITY_SCORE: 17
ADLS_ACUITY_SCORE: 16

## 2021-04-27 ASSESSMENT — MIFFLIN-ST. JEOR: SCORE: 1416.5

## 2021-04-27 ASSESSMENT — VISUAL ACUITY: OU: NORMAL ACUITY

## 2021-04-27 NOTE — PLAN OF CARE
Night shift [id number]    D/I/A:    Neuro-  Clarified frequency of neuro checks with , and neuro checks were performed q2hrs per .  No new neuro changes overnight.  Patient has baseline numbness in bilateral legs/feet, but patient denied numbness in bilateral legs/feet overnight.    On scheduled nimotop.    CV-  Nicardipine gtt titrated to keep SBP less than 140.   Weaned down to (low of) 2.5mg/hr and titrated up to a high of 12.5mg/hr, but unable to wean off gtt,  notified.    Two new PIVs placed due to the other 2 PIVs were leaking at the site.    Scheduled cozaar given.    Resp-  While sleeping SPO2 levels decreased to hihg 80's (patient has history of KAPIL), 2LNC applied, SPO2 increased to mid to high 90's, and  notified.    GI:  Tolerated regular diet.  No N/V.    Endo-  BG checked q4hrs and on regular insulin sliding scale.  BG need to be changed to before meals and at bedtime now that patient is eating.    Labs-  Labs monitored and reviewed.     to come and draw labs this am.    P:  Continue to monitor.  Plan is to try and wean off nicardipine gtt.

## 2021-04-27 NOTE — PLAN OF CARE
Major Shift Events: A+Ox4; follows commands, neuro fully intact; PERRLA; VSS w ex to slight HTN (parameters adjusted per MD- nicardipine off @ 11); LS clear, denies SOB, on RA; BSS+, passing gas but no BM yet, tolerating regular diet; voiding adequately; able to make needs known     Plan: transfer to ?    For vital signs and complete assessments, please see documentation flowsheets.      Regis Isbell RN, BSN

## 2021-04-27 NOTE — PROGRESS NOTES
04/27/21 1400   Quick Adds   Type of Visit Initial Occupational Therapy Evaluation       Present no   Language English   Living Environment   People in home spouse   Current Living Arrangements house   Home Accessibility stairs to enter home   Number of Stairs, Main Entrance 3   Stair Railings, Main Entrance railings on both sides of stairs   Transportation Anticipated car, drives self   Living Environment Comments Pt [place]. Pt has tub/shower combo with grab bars.    Self-Care   Usual Activity Tolerance good   Current Activity Tolerance moderate   Regular Exercise Yes   Activity/Exercise Type walking   Equipment Currently Used at Home grab bar, tub/shower;shower chair  (Pt has FWW and 4WW, however does not currently use)   Activity/Exercise/Self-Care Comment Pt reported walking on treadmill for exercise. Pt previously Mod I/IND with ADLs/IADLs.   Disability/Function   Hearing Difficulty or Deaf yes   Describe hearing loss bilateral hearing loss   Use of hearing assistive devices bilateral hearing aids   Wear Glasses or Blind yes   Vision Management glasses   Concentrating, Remembering or Making Decisions Difficulty no   Difficulty Communicating no   Difficulty Eating/Swallowing no   Walking or Climbing Stairs Difficulty no   Dressing/Bathing Difficulty yes   Dressing/Bathing bathing difficulty, requires equipment   Dressing/Bathing Management shower chair and grab bars   Toileting issues no   Doing Errands Independently Difficulty (such as shopping) no   Fall history within last six months no   Change in Functional Status Since Onset of Current Illness/Injury yes   General Information   Onset of Illness/Injury or Date of Surgery 04/26/21   Referring Physician Twan Martinez MD   Patient/Family Therapy Goal Statement (OT) Increase independence, return home   Additional Occupational Profile Info/Pertinent History of Current Problem Per pt chart: Ronen YIP  Staci is a 85 year old admitted on 4/24/2021 with acute cerebellar hemorrhage. SBPs at OSH > 200's. Requiring Nicardipine gtt. CTA with 3 mm vascular outpouching medial to distal left PCA, possibly a venous structure.    Existing Precautions/Restrictions fall   Limitations/Impairments safety/cognitive   Left Upper Extremity (Weight-bearing Status) full weight-bearing (FWB)   Right Upper Extremity (Weight-bearing Status) full weight-bearing (FWB)   Left Lower Extremity (Weight-bearing Status) full weight-bearing (FWB)   Right Lower Extremity (Weight-bearing Status) full weight-bearing (FWB)   Heart Disease Risk Factors Overweight   General Observations and Info Activity: up with assist   Cognitive Status Examination   Orientation Status orientation to person, place and time   Affect/Mental Status (Cognitive) confused   Follows Commands follows one-step commands   Memory Deficit minimal deficit   Cognitive Status Comments Pt A & O x 4. Pt could follow simple one-step direction,  but demonstrated difficulty with more complex directions. Pt reported that his cognition is near baseline. Will continue to monitor cognition.    Visual Perception   Visual Impairment/Limitations WNL   Sensory   Sensory Quick Adds No deficits were identified   Pain Assessment   Patient Currently in Pain No   Integumentary/Edema   Integumentary/Edema no deficits were identifed   Posture   Posture not impaired   Range of Motion Comprehensive   General Range of Motion no range of motion deficits identified   Strength Comprehensive (MMT)   Comment, General Manual Muscle Testing (MMT) Assessment 5/5 B UE.   Muscle Tone Assessment   Muscle Tone Quick Adds No deficits were identified   Coordination   Upper Extremity Coordination No deficits were identified   Transfers   Transfers sit-stand transfer   Sit-Stand Transfer   Sit-Stand [place] (Transfers) contact guard   Assistive Device (Sit-Stand Transfers) walker, standard   Activities of Daily  Living   BADL Assessment/Intervention grooming;toileting   Grooming Assessment/Training   [place] Level (Grooming) verbal cues;supervision   Position (Grooming) sink side;unsupported standing   Toileting   [place] Level (Toileting) contact guard assist   Position (Toileting) unsupported sitting   Instrumental Activities of Daily Living (IADL)   Previous Responsibilities meal prep;housekeeping;laundry;shopping;yardwork;medication management;finances;driving   IADL Comments Pt previously IND with IADLs.   Clinical Impression   Criteria for Skilled Therapeutic Interventions Met (OT) yes;meets criteria;skilled treatment is necessary   OT Diagnosis Decreased independence in ADLs/IADLs.   OT Problem List-Impairments impacting ADL problems related to;activity tolerance impaired;cognition;strength   Assessment of Occupational Performance 5 or more Performance Deficits   Identified Performance Deficits cognition, g/h, toileting, bathing, dressing   Planned Therapy Interventions (OT) ADL retraining;IADL retraining;cognition;strengthening;home program guidelines;progressive activity/exercise   Clinical Decision Making Complexity (OT) low complexity   Therapy Frequency (OT) 6x/week   Predicted Duration of Therapy 3-5 days   Anticipated Equipment Needs Upon Discharge (OT) other (see comments)  (TBD)   Risk & Benefits of therapy have been explained evaluation/treatment results reviewed;care plan/treatment goals reviewed;risks/benefits reviewed;current/potential barriers reviewed;participants voiced agreement with care plan;participants included;patient   OT Discharge Planning    OT Discharge Recommendation (DC Rec) Acute Rehab Center-Motivated patient will benefit from intensive, interdisciplinary therapy.  Anticipate will be able to tolerate 3 hours of therapy per day   OT Rationale for DC Rec Pt is below baseline and would benefit from intensive therapy in order to increase independence in ADLs/IADLs. Anticipate pt  would tolerate 3 hours of therapy per day.   OT Brief overview of current status  CGA + FWW in room ambulation   Total Evaluation Time (Minutes)   Total Evaluation Time (Minutes) 10

## 2021-04-27 NOTE — PROGRESS NOTES
SPIRITUAL HEALTH SERVICES  SPIRITUAL ASSESSMENT Progress Note  [facility] 4A     REFERRAL SOURCE: Follow up; referral from on-call  Lane    Patient upbeat about recent walk around unit. Reflective conversation about transfer to this hospital via helicopter. Patient named family as being important sources of support, in particular wife, siblings and in-laws.     Patient named onesimo in God as foundational for him, and invited prayer with laying on of hands. He also requested communion.    PLAN: I will continue to follow, and will refer to  authorized to offer communion for follow up tomorrow. Spiritual Health Services remains available for patient, family, and staff support.     Please page the on call  either via [employer] (Spiritual Health/[facility]) or by placing a STAT or ASAP Epic referral for Spiritual Health (which will roll to the on call pager).        Brandi Melvin  Chaplain Resident  Pager: [phone]

## 2021-04-27 NOTE — PROGRESS NOTES
[facility]  NeuroCritical Care Progress Note    Patient Name:  Ronen Small  MRN:  [id number]    :  1935  Date of Service:  2021  Primary care provider:  Adi Mariano        24 HOUR EVENTS:  No events overnight. No plans for repeat angio. Not believed to be aneurysmal hemorrhage.     ASSESSMENT/PLAN:  Ronen Small is a 85 year old admitted on 2021 with acute cerebellar hemorrhage. SBPs at OSH > 200's. Requiring Nicardipine gtt. CTA with 3 mm vascular outpouching medial to distal left PCA, possibly a venous structure.         NEURO:  #Acute cerebellar hemorrhage ICH 2  #Cerebral Edema with Brain Compression  -No aneurysm seen on angio  -sbp <160  -HOB>30  -discontinue Keppra   -discontinue TCD's  -Hold PTA ASA x 2 weeks. CT head before restarting  -PT/OT  -MRI brain  -Neuro q4     CARDIO:  #HTN  -SBP < 160  -discontinue cardene gtt  -PRN's  -PTA norvasc 5mg daily  -PTA coreg increased to 50mg BID        PULM:  #KAPIL  -room air  -O2>92        GI:  #GERD  - Bowel regimen: senna miralax  -Diet ordered  - Last BM: PTA  - GI ppx: Protonix 40mg        RENAL:  #Nocturia  -finasteride 5mg daily  -mag ox 400mg PO  -NAK 1 packet BID  -Daily BMP  -electrolyte replacement        ENDO:  #DMII  #Hyperglycemia  -high dose SS  - Follow glucose levels     HEME:  #ERWIN  - Daily CBC        ID:  #ERWIN  -Daily CBC        Checklist:  FEN: none, replacement, regular  LDA: PIV  PPx:   - DVT: SCD's, heparin SQ  - GI: protonix  Code: full     Dispo: ICU        Patient discussed with Attending Dr. Francisco .  Tani Orta DNP  Ascom: [id number]  2021      ______________________________    24 HOUR VITAL SIGNS SUMMARY:   Temperatures:  Current - Temp: 98.3  F (36.8  C); Max - Temp  Av.4  F (36.9  C)  Min: 97.6  F (36.4  C)  Max: 99.6  F (37.6  C)  Respiration range: Resp  Av.3  Min: 8  Max: 21  Pulse range: Pulse  Av  Min: 55  Max: 78  Blood pressure range:  "Systolic (24hrs), Av , Min:118 , Max:166   ; Diastolic (24hrs), Av, Min:59, Max:94    Pulse oximetry range: SpO2  Av.9 %  Min: 87 %  Max: 99 %    VENTILATOR SETTINGS:  Resp: 15        Intake/Output Summary (Last 24 hours) at 2021 0752  Last data filed at 2021 0600  Gross per 24 hour   Intake 1731.07 ml   Output 975 ml   Net 756.07 ml       BP - Mean:  [] 99    PHYSICAL EXAMINATION:   BP (!) 143/86   Pulse 73   Temp 98.3  F (36.8  C) (Oral)   Resp 15   Ht 1.727 m (5' 8\")   Wt 75.2 kg (165 lb 12.6 oz)   SpO2 95%   BMI 25.21 kg/m      General: Awake and alert, Lying in bed, NAD, cooperative  HEENT: Normocephalic, atraumatic, no epistaxis, no oral lesions, MMM  Resp: CTAB, no increased work of breathing  Cardio: RRR  Abdomen: +BS, Soft, non-distended, non-tender  Extremities: Warm and well perfused, peripheral pulses present, no edema  Skin: Not jaundiced, no rash, no ecchymoses  Psych: Normal mood and affect     Neuro:  Mental status: Awake, alert, attentive; oriented to P/P/T/S  Speech: Fluent, comprehension and repetition intact; No dysarthria  Cranial nerves: Visual fields intact, Eyes conjugate, PERRLA, EOMI w/ normal and smooth pursuit, face expression symmetric, facial sensation intact and symmetric, hearing intact to conversation, shoulder shrug strong, palate rise symmetric, tongue/uvula midline.  Motor: Tone normal. 5/5 strength in x4E. No atrophy or twitches. No Pronator drift present. Finger tapping symmetric. Rolling hands normal rate and coordination  Sensory: Intact to LT, PP x4E; No lateral extinction   Coordination: Ataxia, impaired gait.   Gait: impaired gait. Needed walker with assistance.     CURRENT MEDICATIONS:    amLODIPine  5 mg Oral or Feeding Tube Daily     finasteride  5 mg Oral or Feeding Tube Daily     insulin aspart  1-12 Units Subcutaneous Q4H     levETIRAcetam  500 mg Oral or Feeding Tube BID     losartan  25 mg Oral or Feeding Tube BID     niMODipine  " 60 mg Oral Q4H MARGARET     pantoprazole  40 mg Oral QAM AC     polyethylene glycol  17 g Oral or Feeding Tube Daily     senna-docusate  1 tablet Oral or Feeding Tube BID       PRN MEDICATIONS:  acetaminophen, glucose **OR** dextrose **OR** glucagon, hydrALAZINE, labetalol, ondansetron **OR** ondansetron, prochlorperazine **OR** prochlorperazine **OR** prochlorperazine    INFUSIONS:    niCARdipine 7.5 mg/hr (04/27/21 0610)       ALLERGIES:  Allergies   Allergen Reactions     Atorvastatin Other (See Comments)     Myalgias     Fluoxetine Other (See Comments)     Hypersomnolence     Indomethacin Other (See Comments)     Hypersomnolence         LABS/STUDIES:  Recent Labs   Lab Test 04/27/21  0737 04/26/21  0407 04/25/21  1343 04/25/21  0402 04/24/21  2237   NA  --  136  --  133 133   POTASSIUM  --  3.7 3.3* 3.4 3.5   CHLORIDE  --  104  --  98 99   CO2  --  26  --  27 26   ANIONGAP  --  7  --  7 8   GLC  --  152*  --  218* 260*   BUN  --  17  --  19 18   CR  --  0.89  --  0.75 0.79   PRABHU  --  8.0*  --  8.5 8.6   WBC 10.7 9.1  --  10.2 14.8*   RBC 4.11* 3.84*  --  4.03* 4.22*   HGB 12.4* 11.2*  --  11.6* 12.4*    241  --  258 249       Recent Labs   Lab Test 04/24/21  2237   INR 1.03       No lab results found in last 7 days.      I have personally reviewed all labs and imaging for this patient.

## 2021-04-28 ENCOUNTER — APPOINTMENT (OUTPATIENT)
Dept: OCCUPATIONAL THERAPY | Facility: CLINIC | Age: 86
DRG: 064 | End: 2021-04-28
Attending: PSYCHIATRY & NEUROLOGY
Payer: MEDICARE

## 2021-04-28 ENCOUNTER — APPOINTMENT (OUTPATIENT)
Dept: PHYSICAL THERAPY | Facility: CLINIC | Age: 86
DRG: 064 | End: 2021-04-28
Attending: PSYCHIATRY & NEUROLOGY
Payer: MEDICARE

## 2021-04-28 ENCOUNTER — APPOINTMENT (OUTPATIENT)
Dept: MRI IMAGING | Facility: CLINIC | Age: 86
DRG: 064 | End: 2021-04-28
Attending: STUDENT IN AN ORGANIZED HEALTH CARE EDUCATION/TRAINING PROGRAM
Payer: MEDICARE

## 2021-04-28 LAB
ABO + RH BLD: NORMAL
ABO + RH BLD: NORMAL
ANION GAP SERPL CALCULATED.3IONS-SCNC: 5 MMOL/L (ref 3–14)
BLD GP AB SCN SERPL QL: NORMAL
BLOOD BANK CMNT PATIENT-IMP: NORMAL
BUN SERPL-MCNC: 18 MG/DL (ref 7–30)
CALCIUM SERPL-MCNC: 8.3 MG/DL (ref 8.5–10.1)
CHLORIDE SERPL-SCNC: 102 MMOL/L (ref 94–109)
CO2 SERPL-SCNC: 25 MMOL/L (ref 20–32)
CREAT SERPL-MCNC: 0.99 MG/DL (ref 0.66–1.25)
ERYTHROCYTE [DISTWIDTH] IN BLOOD BY AUTOMATED COUNT: 13.8 % (ref 10–15)
GFR SERPL CREATININE-BSD FRML MDRD: 69 ML/MIN/{1.73_M2}
GLUCOSE BLDC GLUCOMTR-MCNC: 169 MG/DL (ref 70–99)
GLUCOSE BLDC GLUCOMTR-MCNC: 171 MG/DL (ref 70–99)
GLUCOSE BLDC GLUCOMTR-MCNC: 224 MG/DL (ref 70–99)
GLUCOSE BLDC GLUCOMTR-MCNC: 229 MG/DL (ref 70–99)
GLUCOSE SERPL-MCNC: 166 MG/DL (ref 70–99)
HCT VFR BLD AUTO: 36.7 % (ref 40–53)
HGB BLD-MCNC: 12 G/DL (ref 13.3–17.7)
MAGNESIUM SERPL-MCNC: 2.3 MG/DL (ref 1.6–2.3)
MCH RBC QN AUTO: 28.9 PG (ref 26.5–33)
MCHC RBC AUTO-ENTMCNC: 32.7 G/DL (ref 31.5–36.5)
MCV RBC AUTO: 88 FL (ref 78–100)
PHOSPHATE SERPL-MCNC: 3.6 MG/DL (ref 2.5–4.5)
PLATELET # BLD AUTO: 227 10E9/L (ref 150–450)
POTASSIUM SERPL-SCNC: 3.9 MMOL/L (ref 3.4–5.3)
RADIOLOGIST FLAGS: ABNORMAL
RBC # BLD AUTO: 4.15 10E12/L (ref 4.4–5.9)
SODIUM SERPL-SCNC: 132 MMOL/L (ref 133–144)
SPECIMEN EXP DATE BLD: NORMAL
WBC # BLD AUTO: 8.8 10E9/L (ref 4–11)

## 2021-04-28 PROCEDURE — 99291 CRITICAL CARE FIRST HOUR: CPT | Performed by: PSYCHIATRY & NEUROLOGY

## 2021-04-28 PROCEDURE — 97535 SELF CARE MNGMENT TRAINING: CPT | Mod: GO

## 2021-04-28 PROCEDURE — 250N000011 HC RX IP 250 OP 636: Performed by: STUDENT IN AN ORGANIZED HEALTH CARE EDUCATION/TRAINING PROGRAM

## 2021-04-28 PROCEDURE — 86901 BLOOD TYPING SEROLOGIC RH(D): CPT | Performed by: STUDENT IN AN ORGANIZED HEALTH CARE EDUCATION/TRAINING PROGRAM

## 2021-04-28 PROCEDURE — 86900 BLOOD TYPING SEROLOGIC ABO: CPT | Performed by: STUDENT IN AN ORGANIZED HEALTH CARE EDUCATION/TRAINING PROGRAM

## 2021-04-28 PROCEDURE — 70549 MR ANGIOGRAPH NECK W/O&W/DYE: CPT | Mod: 26 | Performed by: RADIOLOGY

## 2021-04-28 PROCEDURE — 99207 MR BRAIN FOR STROKE COMPLETE W/O & W CONTRAST: CPT | Mod: 26 | Performed by: RADIOLOGY

## 2021-04-28 PROCEDURE — 70553 MRI BRAIN STEM W/O & W/DYE: CPT | Mod: 26 | Performed by: RADIOLOGY

## 2021-04-28 PROCEDURE — 200N000002 HC R&B ICU UMMC

## 2021-04-28 PROCEDURE — 70553 MRI BRAIN STEM W/O & W/DYE: CPT | Mod: MG

## 2021-04-28 PROCEDURE — 97750 PHYSICAL PERFORMANCE TEST: CPT | Mod: GP

## 2021-04-28 PROCEDURE — 84100 ASSAY OF PHOSPHORUS: CPT | Performed by: STUDENT IN AN ORGANIZED HEALTH CARE EDUCATION/TRAINING PROGRAM

## 2021-04-28 PROCEDURE — 36415 COLL VENOUS BLD VENIPUNCTURE: CPT | Performed by: STUDENT IN AN ORGANIZED HEALTH CARE EDUCATION/TRAINING PROGRAM

## 2021-04-28 PROCEDURE — 250N000013 HC RX MED GY IP 250 OP 250 PS 637: Performed by: NURSE PRACTITIONER

## 2021-04-28 PROCEDURE — G1004 CDSM NDSC: HCPCS | Performed by: RADIOLOGY

## 2021-04-28 PROCEDURE — 86850 RBC ANTIBODY SCREEN: CPT | Performed by: STUDENT IN AN ORGANIZED HEALTH CARE EDUCATION/TRAINING PROGRAM

## 2021-04-28 PROCEDURE — A9585 GADOBUTROL INJECTION: HCPCS | Performed by: STUDENT IN AN ORGANIZED HEALTH CARE EDUCATION/TRAINING PROGRAM

## 2021-04-28 PROCEDURE — 250N000013 HC RX MED GY IP 250 OP 250 PS 637: Performed by: STUDENT IN AN ORGANIZED HEALTH CARE EDUCATION/TRAINING PROGRAM

## 2021-04-28 PROCEDURE — 250N000013 HC RX MED GY IP 250 OP 250 PS 637: Performed by: PSYCHIATRY & NEUROLOGY

## 2021-04-28 PROCEDURE — 97116 GAIT TRAINING THERAPY: CPT | Mod: GP

## 2021-04-28 PROCEDURE — 85027 COMPLETE CBC AUTOMATED: CPT | Performed by: STUDENT IN AN ORGANIZED HEALTH CARE EDUCATION/TRAINING PROGRAM

## 2021-04-28 PROCEDURE — 255N000002 HC RX 255 OP 636: Performed by: STUDENT IN AN ORGANIZED HEALTH CARE EDUCATION/TRAINING PROGRAM

## 2021-04-28 PROCEDURE — 83735 ASSAY OF MAGNESIUM: CPT | Performed by: STUDENT IN AN ORGANIZED HEALTH CARE EDUCATION/TRAINING PROGRAM

## 2021-04-28 PROCEDURE — 250N000011 HC RX IP 250 OP 636: Performed by: NURSE PRACTITIONER

## 2021-04-28 PROCEDURE — 999N001017 HC STATISTIC GLUCOSE BY METER IP

## 2021-04-28 PROCEDURE — 97530 THERAPEUTIC ACTIVITIES: CPT | Mod: GO

## 2021-04-28 PROCEDURE — 97530 THERAPEUTIC ACTIVITIES: CPT | Mod: GP

## 2021-04-28 PROCEDURE — 80048 BASIC METABOLIC PNL TOTAL CA: CPT | Performed by: STUDENT IN AN ORGANIZED HEALTH CARE EDUCATION/TRAINING PROGRAM

## 2021-04-28 RX ORDER — LABETALOL HYDROCHLORIDE 5 MG/ML
10-20 INJECTION, SOLUTION INTRAVENOUS EVERY 10 MIN PRN
Status: DISCONTINUED | OUTPATIENT
Start: 2021-04-28 | End: 2021-04-30

## 2021-04-28 RX ORDER — HYDROCHLOROTHIAZIDE 25 MG/1
25 TABLET ORAL DAILY
Status: DISCONTINUED | OUTPATIENT
Start: 2021-04-28 | End: 2021-04-28

## 2021-04-28 RX ORDER — HYDROCHLOROTHIAZIDE 25 MG/1
25 TABLET ORAL
Status: DISCONTINUED | OUTPATIENT
Start: 2021-04-28 | End: 2021-04-29

## 2021-04-28 RX ORDER — LANOLIN ALCOHOL/MO/W.PET/CERES
3 CREAM (GRAM) TOPICAL
Status: DISCONTINUED | OUTPATIENT
Start: 2021-04-28 | End: 2021-05-02 | Stop reason: HOSPADM

## 2021-04-28 RX ORDER — GADOBUTROL 604.72 MG/ML
7.5 INJECTION INTRAVENOUS ONCE
Status: COMPLETED | OUTPATIENT
Start: 2021-04-28 | End: 2021-04-28

## 2021-04-28 RX ORDER — BISACODYL 10 MG
10 SUPPOSITORY, RECTAL RECTAL ONCE
Status: DISCONTINUED | OUTPATIENT
Start: 2021-04-28 | End: 2021-05-02 | Stop reason: HOSPADM

## 2021-04-28 RX ADMIN — LABETALOL HYDROCHLORIDE 20 MG: 5 INJECTION, SOLUTION INTRAVENOUS at 20:33

## 2021-04-28 RX ADMIN — LABETALOL HYDROCHLORIDE 20 MG: 5 INJECTION, SOLUTION INTRAVENOUS at 18:05

## 2021-04-28 RX ADMIN — HYDRALAZINE HYDROCHLORIDE 20 MG: 20 INJECTION, SOLUTION INTRAMUSCULAR; INTRAVENOUS at 21:37

## 2021-04-28 RX ADMIN — LABETALOL HYDROCHLORIDE 10 MG: 5 INJECTION, SOLUTION INTRAVENOUS at 08:18

## 2021-04-28 RX ADMIN — HYDROCHLOROTHIAZIDE 25 MG: 25 TABLET ORAL at 10:52

## 2021-04-28 RX ADMIN — LABETALOL HYDROCHLORIDE 10 MG: 5 INJECTION, SOLUTION INTRAVENOUS at 10:41

## 2021-04-28 RX ADMIN — LOSARTAN POTASSIUM 50 MG: 50 TABLET, FILM COATED ORAL at 20:24

## 2021-04-28 RX ADMIN — MELATONIN TAB 3 MG 3 MG: 3 TAB at 21:37

## 2021-04-28 RX ADMIN — LABETALOL HYDROCHLORIDE 20 MG: 5 INJECTION, SOLUTION INTRAVENOUS at 12:49

## 2021-04-28 RX ADMIN — HEPARIN SODIUM 5000 UNITS: 5000 INJECTION, SOLUTION INTRAVENOUS; SUBCUTANEOUS at 11:38

## 2021-04-28 RX ADMIN — HYDRALAZINE HYDROCHLORIDE 20 MG: 20 INJECTION, SOLUTION INTRAMUSCULAR; INTRAVENOUS at 17:33

## 2021-04-28 RX ADMIN — LABETALOL HYDROCHLORIDE 20 MG: 5 INJECTION, SOLUTION INTRAVENOUS at 16:37

## 2021-04-28 RX ADMIN — HYDRALAZINE HYDROCHLORIDE 20 MG: 20 INJECTION, SOLUTION INTRAMUSCULAR; INTRAVENOUS at 16:07

## 2021-04-28 RX ADMIN — AMLODIPINE BESYLATE 10 MG: 10 TABLET ORAL at 07:34

## 2021-04-28 RX ADMIN — HEPARIN SODIUM 5000 UNITS: 5000 INJECTION, SOLUTION INTRAVENOUS; SUBCUTANEOUS at 04:12

## 2021-04-28 RX ADMIN — PRAVASTATIN SODIUM 20 MG: 10 TABLET ORAL at 20:24

## 2021-04-28 RX ADMIN — POLYETHYLENE GLYCOL 3350 17 G: 17 POWDER, FOR SOLUTION ORAL at 07:33

## 2021-04-28 RX ADMIN — MAGNESIUM HYDROXIDE 30 ML: 400 SUSPENSION ORAL at 08:36

## 2021-04-28 RX ADMIN — HEPARIN SODIUM 5000 UNITS: 5000 INJECTION, SOLUTION INTRAVENOUS; SUBCUTANEOUS at 20:24

## 2021-04-28 RX ADMIN — LABETALOL HYDROCHLORIDE 20 MG: 5 INJECTION, SOLUTION INTRAVENOUS at 15:03

## 2021-04-28 RX ADMIN — HYDROCHLOROTHIAZIDE 25 MG: 25 TABLET ORAL at 16:06

## 2021-04-28 RX ADMIN — LABETALOL HYDROCHLORIDE 20 MG: 5 INJECTION, SOLUTION INTRAVENOUS at 21:12

## 2021-04-28 RX ADMIN — FINASTERIDE 5 MG: 5 TABLET, FILM COATED ORAL at 07:34

## 2021-04-28 RX ADMIN — ACETAMINOPHEN 650 MG: 325 TABLET, FILM COATED ORAL at 16:06

## 2021-04-28 RX ADMIN — HYDRALAZINE HYDROCHLORIDE 20 MG: 20 INJECTION, SOLUTION INTRAMUSCULAR; INTRAVENOUS at 13:17

## 2021-04-28 RX ADMIN — PANTOPRAZOLE SODIUM 40 MG: 40 TABLET, DELAYED RELEASE ORAL at 07:34

## 2021-04-28 RX ADMIN — LOSARTAN POTASSIUM 50 MG: 50 TABLET, FILM COATED ORAL at 07:34

## 2021-04-28 RX ADMIN — DOCUSATE SODIUM 50 MG AND SENNOSIDES 8.6 MG 2 TABLET: 8.6; 5 TABLET, FILM COATED ORAL at 07:34

## 2021-04-28 RX ADMIN — GADOBUTROL 7.5 ML: 604.72 INJECTION INTRAVENOUS at 09:59

## 2021-04-28 RX ADMIN — HYDRALAZINE HYDROCHLORIDE 20 MG: 20 INJECTION, SOLUTION INTRAMUSCULAR; INTRAVENOUS at 08:33

## 2021-04-28 ASSESSMENT — ACTIVITIES OF DAILY LIVING (ADL)
ADLS_ACUITY_SCORE: 16
ADLS_ACUITY_SCORE: 17
ADLS_ACUITY_SCORE: 16
ADLS_ACUITY_SCORE: 17
ADLS_ACUITY_SCORE: 16
ADLS_ACUITY_SCORE: 17

## 2021-04-28 ASSESSMENT — MIFFLIN-ST. JEOR: SCORE: 1398.5

## 2021-04-28 NOTE — PLAN OF CARE
Major Shift Events: A+Ox4; follows commands, neuro fully intact; PERRLA; VSS w ex to HTN. Pt received several amounts of prn labetalol and hydralazine with little to no effect, team aware and deciding not to restart nicardipine/ wanting to continue prn bumps; LS clear, denies SOB, on RA; BSS+, passing gas. Had multiple bowel movements; tolerating regular diet; voiding adequately/small amounts, c/o penile pain/prostate issues; able to make needs known      Plan: control blood pressure    For vital signs and complete assessments, please see documentation flowsheets.      Regis Isbell RN, BSN

## 2021-04-28 NOTE — PROGRESS NOTES
Care Management Follow Up    Length of Stay (days): 4    Expected Discharge Date:       Concerns to be Addressed: all concerns addressed in this encounter, grief and loss, adjustment to diagnosis/illness, home safety     Patient plan of care discussed at interdisciplinary rounds: Yes    Anticipated Discharge Disposition: Acute Rehab     Anticipated Discharge Services:  Acute Rehab  Anticipated Discharge DME:  Acute Rehab    Patient/family educated on Medicare website which has current facility and service quality ratings: yes  Education Provided on the Discharge Plan:  yes  Patient/Family in Agreement with the Plan:  yes    Referrals Placed by CM/JOSE:    [address] [place], [place] [address]  F: [phone]  [phone]    *Referral sent     pay costs discussed: transportation costs    Additional Information:  JOSE initiated referral for ARU based on Pt preference. SW will continue to follow for psychosocial support, resources and advocate on behalf of the patient.    NURYS Goodman, [facility]  ICU    [facility]  Phone: [phone]  Pager: [phone]

## 2021-04-28 NOTE — PROGRESS NOTES
[facility]  NeuroCritical Care Progress Note    Patient Name:  Ronen Small  MRN:  [id number]    :  1935  Date of Service:  2021  Primary care provider:  Adi Mariano        24 HOUR EVENTS:  BP remains elevated. Will consider transferring to floor if BP better controlled. Will get MRI today    ASSESSMENT/PLAN:  Ronen Small is a 85 year old admitted on 2021 with acute cerebellar hemorrhage. SBPs at OSH > 200's. Requiring Nicardipine gtt. CTA with 3 mm vascular outpouching medial to distal left PCA, possibly a venous structure.         NEURO:  #Acute cerebellar hemorrhage ICH 2  #Cerebral Edema with Brain Compression  -No aneurysm seen on angio  -sbp <160  -HOB>30  -Hold PTA ASA x 2 weeks. CT head before restarting  -PT/OT  -MRI brain scheduled this morning     CARDIO:  #HTN  -SBP < 160  -PRN's  -PTA norvasc increased to 10mg daily  -PTA coreg increased to 50mg BID  -Start hydrochlorothiazide 25 BID          PULM:  #KAPIL  -room air  -O2>92        GI:  #GERD  - Bowel regimen: senna miralax MOM x1  -Diet ordered  - Last BM:   - GI ppx: Protonix 40mg        RENAL:  #Nocturia  -PTA finasteride 5mg daily  -mag ox 400mg PO  -NAK 1 packet BID  -Daily BMP  -electrolyte replacement        ENDO:  #DMII  #Hyperglycemia  -high dose SS  - Follow glucose levels     HEME:  #ERWIN  - Daily CBC        ID:  #ERWIN  -Daily CBC        Checklist:  FEN: none, replacement, regular  LDA: PIV  PPx:   - DVT: SCD's, heparin SQ  - GI: protonix  Code: full     Dispo: ICU        Patient discussed with Attending Dr. Francisco .    Tani Orta DNP  Ascom: [id number]  2021      ______________________________    24 HOUR VITAL SIGNS SUMMARY:   Temperatures:  Current - Temp: 98.3  F (36.8  C); Max - Temp  Av.2  F (36.8  C)  Min: 97.8  F (36.6  C)  Max: 98.7  F (37.1  C)  Respiration range: Resp  Av.7  Min: 7  Max: 27  Pulse range: Pulse  Av.4  Min: 56  Max: 87  Blood  "pressure range: Systolic (24hrs), Av , Min:108 , Max:160   ; Diastolic (24hrs), Av, Min:64, Max:98    Pulse oximetry range: SpO2  Av.1 %  Min: 91 %  Max: 99 %    VENTILATOR SETTINGS:  Resp: 12        Intake/Output Summary (Last 24 hours) at 2021 0806  Last data filed at 2021 0500  Gross per 24 hour   Intake 760 ml   Output 1000 ml   Net -240 ml       BP - Mean:  [] 90    PHYSICAL EXAMINATION:   BP (!) 145/76   Pulse 70   Temp 98.3  F (36.8  C) (Oral)   Resp 12   Ht 1.727 m (5' 8\")   Wt 73.4 kg (161 lb 13.1 oz)   SpO2 95%   BMI 24.60 kg/m      General: Awake and alert, Lying in bed, NAD, cooperative  HEENT: Normocephalic, atraumatic, no epistaxis, no oral lesions, MMM  Resp: CTAB, no increased work of breathing  Cardio: RRR  Abdomen: +BS, Soft, non-distended, non-tender  Extremities: Warm and well perfused, peripheral pulses present, no edema  Skin: Not jaundiced, no rash, no ecchymoses  Psych: Normal mood and affect     Neuro:  Mental status: Awake, alert, attentive; oriented to P/P/T/S  Speech: Fluent, comprehension and repetition intact; No dysarthria  Cranial nerves: Visual fields intact, Eyes conjugate, PERRLA, EOMI w/ normal and smooth pursuit, face expression symmetric, facial sensation intact and symmetric, hearing intact to conversation, shoulder shrug strong, palate rise symmetric, tongue/uvula midline.  Motor: Tone normal. 5/5 strength in x4E. No atrophy or twitches. No Pronator drift present. Finger tapping symmetric. Rolling hands normal rate and coordination  Sensory: Intact to LT, PP x4E; No lateral extinction   Coordination: Ataxia, impaired gait.   Gait: impaired gait. Needed walker with assistance.     CURRENT MEDICATIONS:    amLODIPine  10 mg Oral or Feeding Tube Daily     finasteride  5 mg Oral or Feeding Tube Daily     heparin ANTICOAGULANT  5,000 Units Subcutaneous Q8H     insulin aspart  1-10 Units Subcutaneous TID AC     insulin aspart  1-7 Units " Subcutaneous At Bedtime     losartan  50 mg Oral or Feeding Tube BID     pantoprazole  40 mg Oral QAM AC     polyethylene glycol  17 g Oral or Feeding Tube Daily     pravastatin  20 mg Oral Daily at 8 pm     senna-docusate  2 tablet Oral or Feeding Tube BID       PRN MEDICATIONS:  acetaminophen, glucose **OR** dextrose **OR** glucagon, hydrALAZINE, labetalol    INFUSIONS:      ALLERGIES:  Allergies   Allergen Reactions     Atorvastatin Other (See Comments)     Myalgias     Fluoxetine Other (See Comments)     Hypersomnolence     Indomethacin Other (See Comments)     Hypersomnolence         LABS/STUDIES:  Recent Labs   Lab Test 04/28/21  0440 04/27/21  0737 04/26/21  0407   * 130* 136   POTASSIUM 3.9 3.4 3.7   CHLORIDE 102 100 104   CO2 25 24 26   ANIONGAP 5 6 7   * 175* 152*   BUN 18 17 17   CR 0.99 0.85 0.89   PRABHU 8.3* 8.3* 8.0*   WBC 8.8 10.7 9.1   RBC 4.15* 4.11* 3.84*   HGB 12.0* 12.4* 11.2*    228 241       Recent Labs   Lab Test 04/24/21  2237   INR 1.03       No lab results found in last 7 days.      I have personally reviewed all labs and imaging for this patient.

## 2021-04-28 NOTE — PLAN OF CARE
Night shift [id number]    D/I/A:    Neuro-  No new neuro changes overnight.  Still has unsteady gait especially with pivoting, so an assist of 2 was used.    CV-  Goal for SBP was to keep greater than 160.  Cozaar and norvasc increased yesterday.  Remained off nicardipine gtt overnight.    Resp-  While sleeping SPO2 decreased to high 80's (patient has a history of KAPIL), placed on 1-2LNC, and SPO2 increased to mid to high 90's.    Endo-  BG checked at bedtime and on regular insulin sliding scale.    Labs-  Labs monitored and reviewed.    ROUTINE ICU LABS (Last four results)  CMP  Recent Labs   Lab 04/28/21 0440 04/27/21  0737 04/26/21  0407 04/25/21  1343 04/25/21  0402   * 130* 136  --  133   POTASSIUM 3.9 3.4 3.7 3.3* 3.4   CHLORIDE 102 100 104  --  98   CO2 25 24 26  --  27   ANIONGAP 5 6 7  --  7   * 175* 152*  --  218*   BUN 18 17 17  --  19   CR 0.99 0.85 0.89  --  0.75   GFRESTIMATED 69 80 78  --  84   GFRESTBLACK 80 >90 >90  --  >90   PRABHU 8.3* 8.3* 8.0*  --  8.5   MAG  --  2.3  --  2.0  --    PHOS  --  3.5  --  2.5  --      CBC  Recent Labs   Lab 04/28/21 0440 04/27/21  0737 04/26/21  0407 04/25/21  0402   WBC 8.8 10.7 9.1 10.2   RBC 4.15* 4.11* 3.84* 4.03*   HGB 12.0* 12.4* 11.2* 11.6*   HCT 36.7* 36.6* 33.3* 34.9*   MCV 88 89 87 87   MCH 28.9 30.2 29.2 28.8   MCHC 32.7 33.9 33.6 33.2   RDW 13.8 13.7 13.4 12.9    228 241 258     P:  Continue to monitor.  Plan is to get an MRI today (checklist was sent to MRI last night).  See flowsheets for details.

## 2021-04-28 NOTE — PROGRESS NOTES
Care Management Follow Up    Length of Stay (days): 4    Expected Discharge Date: TBD      Concerns to be Addressed: all concerns addressed in this encounter, grief and loss, adjustment to diagnosis/illness, home safety     Patient plan of care discussed at interdisciplinary rounds: Yes    Anticipated Discharge Disposition: Acute Rehab  Anticipated Discharge Services: TBD  Anticipated Discharge DME: TBD    Patient/family educated on Medicare website which has current facility and service quality ratings: yes  Education Provided on the Discharge Plan: yes    Patient/Family in Agreement with the Plan: yes    Referrals Placed by CM/JOSE:   [address] [place], [place] [address]  F: [phone]  [phone]  *Referral sent      Private pay costs discussed: Not applicable    Additional Information:  JOSE following for dispo needs. JOSE spoke with Kely ([facility]; Ph: [phone]) and the facility does have open beds for rehab. Kely requested pt's SSN however it was not listed on facesheet. Kely has access to Epic but will need pt's SSN to review pt. Kely shared that if she is not able to answer her phone, her line is confidential.    JOSE will gather pt's SSN and provide it to Kely so that she can review pt for ARU.    NURYS Koch, SW  Acute Care Float   [facility]  Phone: [phone]  Pager: [phone]

## 2021-04-28 NOTE — PROGRESS NOTES
04/28/21 1300   Signing Clinician's Name / Credentials   Signing clinician's name / credentials Jose Latif DPT   Esteban Balance Scale (CINDY DYSON, MARISSA S, ART BLAIR, ULI PANG: MEASURING BALANCE IN THE ELDERLY: VALIDATION OF AN INSTRUMENT. CAN. J. PUB. HEALTH, JULY/AUGUST SUPPLEMENT 2:S7-11, 1992.)   Sit To Stand 3   Standing Unsupported 3   Sitting Unsupported 4   Stand to Sit 1   Transfers 1   Standing with Eyes Closed 3   Standing Unsupported, Feet Together 1   Reach Forward With Outstretched Arm 1   Retrieve Object From Floor 0   Turning to Look Behind 2   Turn 360 Degrees 0   Placing Alternate Foot on Stool (4-6 inches) 1   Unsupported Tandem Stand (Demonstrate to Subject) 1   One Leg Stand 0   Total Score (A score of 45 or less has been correlated with an increased risk of falls)   Total Score (out of 56) 21     Esteban Balance Scale (BBS) Cutoff Scores: A score of < 45/56 indicates an increased risk for falls.   Patient Score: 21/56    The BBS is a measure of static and dynamic standing balance that has been validated in community dwelling elderly individuals and individuals who have Parkinson's Disease, MS, and those who are s/p CVA and TBI. The test is administered without an assistive device. Scores from the Esteban are used to determine the probability of falling based on the patient's previous history of falls and their test performance.     Minimal Detectable Change = 6.5   & Minimal Detectable Change (Parkinson's Disease) = 5 according to Kiran & Umeshey 2008    Assessment (rationale for performing, application to patient s function & care plan): Fall risk assessment, discharge recs    Minutes billed as physical performance test: 17

## 2021-04-29 ENCOUNTER — APPOINTMENT (OUTPATIENT)
Dept: PHYSICAL THERAPY | Facility: CLINIC | Age: 86
DRG: 064 | End: 2021-04-29
Attending: PSYCHIATRY & NEUROLOGY
Payer: MEDICARE

## 2021-04-29 ENCOUNTER — APPOINTMENT (OUTPATIENT)
Dept: OCCUPATIONAL THERAPY | Facility: CLINIC | Age: 86
DRG: 064 | End: 2021-04-29
Attending: PSYCHIATRY & NEUROLOGY
Payer: MEDICARE

## 2021-04-29 LAB
ANION GAP SERPL CALCULATED.3IONS-SCNC: 7 MMOL/L (ref 3–14)
BUN SERPL-MCNC: 21 MG/DL (ref 7–30)
CALCIUM SERPL-MCNC: 8.7 MG/DL (ref 8.5–10.1)
CHLORIDE SERPL-SCNC: 97 MMOL/L (ref 94–109)
CO2 SERPL-SCNC: 26 MMOL/L (ref 20–32)
CREAT SERPL-MCNC: 0.92 MG/DL (ref 0.66–1.25)
ERYTHROCYTE [DISTWIDTH] IN BLOOD BY AUTOMATED COUNT: 13.8 % (ref 10–15)
GFR SERPL CREATININE-BSD FRML MDRD: 75 ML/MIN/{1.73_M2}
GLUCOSE BLDC GLUCOMTR-MCNC: 164 MG/DL (ref 70–99)
GLUCOSE BLDC GLUCOMTR-MCNC: 203 MG/DL (ref 70–99)
GLUCOSE BLDC GLUCOMTR-MCNC: 206 MG/DL (ref 70–99)
GLUCOSE BLDC GLUCOMTR-MCNC: 208 MG/DL (ref 70–99)
GLUCOSE BLDC GLUCOMTR-MCNC: 338 MG/DL (ref 70–99)
GLUCOSE SERPL-MCNC: 175 MG/DL (ref 70–99)
HCT VFR BLD AUTO: 35.4 % (ref 40–53)
HGB BLD-MCNC: 12 G/DL (ref 13.3–17.7)
MAGNESIUM SERPL-MCNC: 2.3 MG/DL (ref 1.6–2.3)
MCH RBC QN AUTO: 29.6 PG (ref 26.5–33)
MCHC RBC AUTO-ENTMCNC: 33.9 G/DL (ref 31.5–36.5)
MCV RBC AUTO: 87 FL (ref 78–100)
PHOSPHATE SERPL-MCNC: 3.8 MG/DL (ref 2.5–4.5)
PLATELET # BLD AUTO: 248 10E9/L (ref 150–450)
POTASSIUM SERPL-SCNC: 3.4 MMOL/L (ref 3.4–5.3)
POTASSIUM SERPL-SCNC: 3.8 MMOL/L (ref 3.4–5.3)
RBC # BLD AUTO: 4.06 10E12/L (ref 4.4–5.9)
SODIUM SERPL-SCNC: 130 MMOL/L (ref 133–144)
WBC # BLD AUTO: 8.9 10E9/L (ref 4–11)

## 2021-04-29 PROCEDURE — 36415 COLL VENOUS BLD VENIPUNCTURE: CPT | Performed by: PSYCHIATRY & NEUROLOGY

## 2021-04-29 PROCEDURE — 250N000011 HC RX IP 250 OP 636: Performed by: NURSE PRACTITIONER

## 2021-04-29 PROCEDURE — 250N000013 HC RX MED GY IP 250 OP 250 PS 637: Performed by: NURSE PRACTITIONER

## 2021-04-29 PROCEDURE — 97110 THERAPEUTIC EXERCISES: CPT | Mod: GP

## 2021-04-29 PROCEDURE — 250N000011 HC RX IP 250 OP 636: Performed by: STUDENT IN AN ORGANIZED HEALTH CARE EDUCATION/TRAINING PROGRAM

## 2021-04-29 PROCEDURE — 250N000013 HC RX MED GY IP 250 OP 250 PS 637: Performed by: STUDENT IN AN ORGANIZED HEALTH CARE EDUCATION/TRAINING PROGRAM

## 2021-04-29 PROCEDURE — 999N001017 HC STATISTIC GLUCOSE BY METER IP

## 2021-04-29 PROCEDURE — 36415 COLL VENOUS BLD VENIPUNCTURE: CPT | Performed by: STUDENT IN AN ORGANIZED HEALTH CARE EDUCATION/TRAINING PROGRAM

## 2021-04-29 PROCEDURE — 84132 ASSAY OF SERUM POTASSIUM: CPT | Performed by: PSYCHIATRY & NEUROLOGY

## 2021-04-29 PROCEDURE — 85027 COMPLETE CBC AUTOMATED: CPT | Performed by: STUDENT IN AN ORGANIZED HEALTH CARE EDUCATION/TRAINING PROGRAM

## 2021-04-29 PROCEDURE — 250N000013 HC RX MED GY IP 250 OP 250 PS 637: Performed by: PSYCHIATRY & NEUROLOGY

## 2021-04-29 PROCEDURE — 120N000002 HC R&B MED SURG/OB UMMC

## 2021-04-29 PROCEDURE — 84100 ASSAY OF PHOSPHORUS: CPT | Performed by: STUDENT IN AN ORGANIZED HEALTH CARE EDUCATION/TRAINING PROGRAM

## 2021-04-29 PROCEDURE — 83735 ASSAY OF MAGNESIUM: CPT | Performed by: STUDENT IN AN ORGANIZED HEALTH CARE EDUCATION/TRAINING PROGRAM

## 2021-04-29 PROCEDURE — 97116 GAIT TRAINING THERAPY: CPT | Mod: GP

## 2021-04-29 PROCEDURE — 80048 BASIC METABOLIC PNL TOTAL CA: CPT | Performed by: STUDENT IN AN ORGANIZED HEALTH CARE EDUCATION/TRAINING PROGRAM

## 2021-04-29 PROCEDURE — 97112 NEUROMUSCULAR REEDUCATION: CPT | Mod: GP

## 2021-04-29 PROCEDURE — 99233 SBSQ HOSP IP/OBS HIGH 50: CPT | Performed by: PSYCHIATRY & NEUROLOGY

## 2021-04-29 PROCEDURE — 97535 SELF CARE MNGMENT TRAINING: CPT | Mod: GO | Performed by: OCCUPATIONAL THERAPIST

## 2021-04-29 RX ORDER — CARVEDILOL 3.12 MG/1
3.12 TABLET ORAL 2 TIMES DAILY WITH MEALS
Status: DISCONTINUED | OUTPATIENT
Start: 2021-04-29 | End: 2021-04-29

## 2021-04-29 RX ORDER — CARVEDILOL 3.12 MG/1
3.12 TABLET ORAL ONCE
Status: COMPLETED | OUTPATIENT
Start: 2021-04-29 | End: 2021-04-29

## 2021-04-29 RX ORDER — CARVEDILOL 6.25 MG/1
6.25 TABLET ORAL 2 TIMES DAILY WITH MEALS
Status: DISCONTINUED | OUTPATIENT
Start: 2021-04-29 | End: 2021-04-30

## 2021-04-29 RX ORDER — POTASSIUM CHLORIDE 750 MG/1
20 TABLET, EXTENDED RELEASE ORAL ONCE
Status: COMPLETED | OUTPATIENT
Start: 2021-04-29 | End: 2021-04-29

## 2021-04-29 RX ORDER — POTASSIUM CHLORIDE 750 MG/1
40 TABLET, EXTENDED RELEASE ORAL ONCE
Status: COMPLETED | OUTPATIENT
Start: 2021-04-29 | End: 2021-04-29

## 2021-04-29 RX ADMIN — PANTOPRAZOLE SODIUM 40 MG: 40 TABLET, DELAYED RELEASE ORAL at 08:27

## 2021-04-29 RX ADMIN — CARVEDILOL 3.12 MG: 3.12 TABLET, FILM COATED ORAL at 08:27

## 2021-04-29 RX ADMIN — DOCUSATE SODIUM 50 MG AND SENNOSIDES 8.6 MG 2 TABLET: 8.6; 5 TABLET, FILM COATED ORAL at 08:27

## 2021-04-29 RX ADMIN — PRAVASTATIN SODIUM 20 MG: 10 TABLET ORAL at 20:36

## 2021-04-29 RX ADMIN — LOSARTAN POTASSIUM 50 MG: 50 TABLET, FILM COATED ORAL at 08:27

## 2021-04-29 RX ADMIN — POLYETHYLENE GLYCOL 3350 17 G: 17 POWDER, FOR SOLUTION ORAL at 08:29

## 2021-04-29 RX ADMIN — CARVEDILOL 3.12 MG: 3.12 TABLET, FILM COATED ORAL at 11:37

## 2021-04-29 RX ADMIN — HYDRALAZINE HYDROCHLORIDE 20 MG: 20 INJECTION, SOLUTION INTRAMUSCULAR; INTRAVENOUS at 01:21

## 2021-04-29 RX ADMIN — AMLODIPINE BESYLATE 10 MG: 10 TABLET ORAL at 08:27

## 2021-04-29 RX ADMIN — FINASTERIDE 5 MG: 5 TABLET, FILM COATED ORAL at 08:27

## 2021-04-29 RX ADMIN — HEPARIN SODIUM 5000 UNITS: 5000 INJECTION, SOLUTION INTRAVENOUS; SUBCUTANEOUS at 04:23

## 2021-04-29 RX ADMIN — HEPARIN SODIUM 5000 UNITS: 5000 INJECTION, SOLUTION INTRAVENOUS; SUBCUTANEOUS at 20:36

## 2021-04-29 RX ADMIN — ACETAMINOPHEN 650 MG: 325 TABLET, FILM COATED ORAL at 20:43

## 2021-04-29 RX ADMIN — HEPARIN SODIUM 5000 UNITS: 5000 INJECTION, SOLUTION INTRAVENOUS; SUBCUTANEOUS at 11:37

## 2021-04-29 RX ADMIN — HYDRALAZINE HYDROCHLORIDE 20 MG: 20 INJECTION, SOLUTION INTRAMUSCULAR; INTRAVENOUS at 08:38

## 2021-04-29 RX ADMIN — LABETALOL HYDROCHLORIDE 20 MG: 5 INJECTION, SOLUTION INTRAVENOUS at 23:07

## 2021-04-29 RX ADMIN — POTASSIUM CHLORIDE 20 MEQ: 750 TABLET, EXTENDED RELEASE ORAL at 12:17

## 2021-04-29 RX ADMIN — CARVEDILOL 6.25 MG: 6.25 TABLET, FILM COATED ORAL at 18:21

## 2021-04-29 RX ADMIN — DOCUSATE SODIUM 50 MG AND SENNOSIDES 8.6 MG 2 TABLET: 8.6; 5 TABLET, FILM COATED ORAL at 20:36

## 2021-04-29 RX ADMIN — LOSARTAN POTASSIUM 50 MG: 50 TABLET, FILM COATED ORAL at 20:36

## 2021-04-29 RX ADMIN — POTASSIUM CHLORIDE 40 MEQ: 750 TABLET, EXTENDED RELEASE ORAL at 06:34

## 2021-04-29 RX ADMIN — LABETALOL HYDROCHLORIDE 20 MG: 5 INJECTION, SOLUTION INTRAVENOUS at 14:16

## 2021-04-29 RX ADMIN — LABETALOL HYDROCHLORIDE 20 MG: 5 INJECTION, SOLUTION INTRAVENOUS at 09:43

## 2021-04-29 ASSESSMENT — VISUAL ACUITY
OU: BLURRED VISION;DOUBLE VISION/DIPLOPIA
OU: BLURRED VISION;DOUBLE VISION/DIPLOPIA

## 2021-04-29 ASSESSMENT — MIFFLIN-ST. JEOR: SCORE: 1409.5

## 2021-04-29 ASSESSMENT — ACTIVITIES OF DAILY LIVING (ADL)
ADLS_ACUITY_SCORE: 18
ADLS_ACUITY_SCORE: 16
ADLS_ACUITY_SCORE: 18
ADLS_ACUITY_SCORE: 16
ADLS_ACUITY_SCORE: 19
ADLS_ACUITY_SCORE: 16

## 2021-04-29 NOTE — PROVIDER NOTIFICATION
NCC notified SBP continue to be > 160, hydralazine and labetalol given. Per NCC, q4h VS okay; recheck at noon and treat if necessary.

## 2021-04-29 NOTE — PLAN OF CARE
Transferred to: Unit 6A at 13:43. Report given to JOHANA Patel  Belongings: with patient  Garcia removed? No: Difficult insertion per urology for retention. Per urology, do not remove.  Central line removed? NA  Chart and medications sent with patient Yes  Family notified: Yes, wife with pt at bedside.

## 2021-04-29 NOTE — PROGRESS NOTES
Urology Procedure Note    Paged by RN for difficult catheter placement. Patient has hx of BPH and PVP.     Verbal consent obtained. Patient was prepped and draped in usual sterile fashion. A 16 Coude catheter was inserted into the bladder with moderate resistance at the bladder neck. There was good return of clear yellow urine. 10cc water put into balloon.     Keep catheter for at least 7 days before attempting TOV.   Urology will sign off.     Adelita Black MD

## 2021-04-29 NOTE — PLAN OF CARE
"Major Shift Events: A+Ox4, follows all commands. [place] baseline. States he has blurry vision (glasses at baseline) and \"possibly double vision but I can't remember if it was like that before the stroke. I lost my good glasses in the helicopter on the way here.\" At 12:00 assessment, pt stated vision was improving; was able to tell with one eye closed how many fingers were held up but stated \"I see two fingers but I know you're only holding up one.\" SR 60s-80s, afebrile. Hydralazine x1, labetalol x1 for SBP > 160 (see provider notification note for BP specifics). Additional dose of coreg per NCC. RA, LS clear. Regular diet, eating well. Garcia (difficult insertion by urology) for retention. Urine red/pink, adequate output. No BM this shift. Up to chair with therapy with walker (assist of 1). R groin site intact.    Gtts: N/A    Plan: Tx to 6A. Continue to monitor, notify MD with changes/concerns.     Hours of care: 0700 - 1343 (transferred  to 6A)    For vital signs and complete assessments, please see documentation flowsheets.     "

## 2021-04-29 NOTE — PLAN OF CARE
Major Shift Events:  Pt began complaining of lower back pain and abdominal discomfort early on in the night. Upon assessment, bladder found to be very distended. Bladder scan showed 900cc. Attempted straight cath X2, and then again with a coude catheter X1. All attempts failed, so urology was consulted. Urology placed a lin cath, and pt had relief of symptoms. 1250cc was put out at time of insertion. Pt BPs high and required frequent treatment with PRNs. After bladder was emptied only 20 of hydralazin given. 1 small BM overnight.  Plan: Work with PT/OT, continue to manage BPs as needed.   For vital signs and complete assessments, please see documentation flowsheets.    Problem: Adult Inpatient Plan of Care  Goal: Plan of Care Review  Outcome: No Change

## 2021-04-29 NOTE — PLAN OF CARE
Status: S/p cerebellar hemorrhage  VS: VSS on RA ex HTN; SBP <160, labetolol given x1  Neuros: A&Ox4, [place]. Blurry/double vision.   GI: Toleraing regular diet. No BM this shift, last BM 4/28  : Garcia w/adequate output  IV: PIV SL  Activity: Ax1 w/gb and walker. Worked w/PT, Walked in halls x1  Pain: Denies  Skin: Small rash on R elbow at old IV site; R groin site soft  Labs/Tests: WNL; blood sugar elevated  Social: Wife at bedside, supportive  Plan of care: Eventual discharge to ARU when ready. Continue to monitor BP and encourage activity    Arrived from:  4A  Belongings/meds: 3 belongings bags, 1 red duffle at bedside. Meds placed in med room bin    2 RN Skin Assessment Completed by:  Amanda KING and Linette ANDREWS  Non-intact findings documented (yes/no/NA): See above

## 2021-04-29 NOTE — PROGRESS NOTES
"Care Management Follow Up    Length of Stay (days): 5    Expected Discharge Date:       Concerns to be Addressed: all concerns addressed in this encounter, grief and loss, adjustment to diagnosis/illness, home safety     Patient plan of care discussed at interdisciplinary rounds: Yes    Anticipated Discharge Disposition: Acute Rehab     Anticipated Discharge Services:  Acute Rehab  Anticipated Discharge DME:  Rehab services     Patient/family educated on Medicare website which has current facility and service quality ratings: yes  Education Provided on the Discharge Plan: yes   Patient/Family in Agreement with the Plan:  yes    Referrals Placed by CM/JOSE:    [facility]  [address], [place] [address]  F: [phone]  [phone]  *Referral sent    Spoke with Kely in Admissions ([phone])   Private pay costs discussed: transportation costs    Additional Information:  SW met with Pt and spouse to discuss discharge planning and gain more information requested by facility. Pt and spouse confirmed that [facility] in [place] is their first choice as it is close to home. SW informed rehab as the rec states \"ARU' and this facility is considered TCU via medicare.gov. Rehab reported that this facility is a good option for Pt and Pt can choose any facility he chooses.     Facility following up with nursing for clinical review. Available beds. Pt to transfer to . Family is informed of current plan. Pt and spouse confirmed they would like to pursue this option for discharge. SW will continue to follow for psychosocial support, resources and advocate on behalf of the patient.    NURYS Goodman, SW  ICU    [facility]  Phone: [phone]  Pager: [phone]          "

## 2021-04-30 ENCOUNTER — APPOINTMENT (OUTPATIENT)
Dept: OCCUPATIONAL THERAPY | Facility: CLINIC | Age: 86
DRG: 064 | End: 2021-04-30
Attending: PSYCHIATRY & NEUROLOGY
Payer: MEDICARE

## 2021-04-30 LAB
ANION GAP SERPL CALCULATED.3IONS-SCNC: 7 MMOL/L (ref 3–14)
BUN SERPL-MCNC: 20 MG/DL (ref 7–30)
CALCIUM SERPL-MCNC: 8.7 MG/DL (ref 8.5–10.1)
CHLORIDE SERPL-SCNC: 100 MMOL/L (ref 94–109)
CO2 SERPL-SCNC: 27 MMOL/L (ref 20–32)
CREAT SERPL-MCNC: 0.88 MG/DL (ref 0.66–1.25)
ERYTHROCYTE [DISTWIDTH] IN BLOOD BY AUTOMATED COUNT: 13.5 % (ref 10–15)
GFR SERPL CREATININE-BSD FRML MDRD: 78 ML/MIN/{1.73_M2}
GLUCOSE BLDC GLUCOMTR-MCNC: 163 MG/DL (ref 70–99)
GLUCOSE BLDC GLUCOMTR-MCNC: 163 MG/DL (ref 70–99)
GLUCOSE BLDC GLUCOMTR-MCNC: 179 MG/DL (ref 70–99)
GLUCOSE BLDC GLUCOMTR-MCNC: 205 MG/DL (ref 70–99)
GLUCOSE BLDC GLUCOMTR-MCNC: 210 MG/DL (ref 70–99)
GLUCOSE BLDC GLUCOMTR-MCNC: 215 MG/DL (ref 70–99)
GLUCOSE SERPL-MCNC: 197 MG/DL (ref 70–99)
HCT VFR BLD AUTO: 36.4 % (ref 40–53)
HGB BLD-MCNC: 12.4 G/DL (ref 13.3–17.7)
MAGNESIUM SERPL-MCNC: 2.1 MG/DL (ref 1.6–2.3)
MCH RBC QN AUTO: 30.4 PG (ref 26.5–33)
MCHC RBC AUTO-ENTMCNC: 34.1 G/DL (ref 31.5–36.5)
MCV RBC AUTO: 89 FL (ref 78–100)
PHOSPHATE SERPL-MCNC: 2.9 MG/DL (ref 2.5–4.5)
PLATELET # BLD AUTO: 242 10E9/L (ref 150–450)
POTASSIUM SERPL-SCNC: 3.8 MMOL/L (ref 3.4–5.3)
RBC # BLD AUTO: 4.08 10E12/L (ref 4.4–5.9)
SODIUM SERPL-SCNC: 134 MMOL/L (ref 133–144)
WBC # BLD AUTO: 7.7 10E9/L (ref 4–11)

## 2021-04-30 PROCEDURE — 250N000011 HC RX IP 250 OP 636: Performed by: STUDENT IN AN ORGANIZED HEALTH CARE EDUCATION/TRAINING PROGRAM

## 2021-04-30 PROCEDURE — 250N000013 HC RX MED GY IP 250 OP 250 PS 637: Performed by: STUDENT IN AN ORGANIZED HEALTH CARE EDUCATION/TRAINING PROGRAM

## 2021-04-30 PROCEDURE — 80048 BASIC METABOLIC PNL TOTAL CA: CPT | Performed by: STUDENT IN AN ORGANIZED HEALTH CARE EDUCATION/TRAINING PROGRAM

## 2021-04-30 PROCEDURE — 85027 COMPLETE CBC AUTOMATED: CPT | Performed by: STUDENT IN AN ORGANIZED HEALTH CARE EDUCATION/TRAINING PROGRAM

## 2021-04-30 PROCEDURE — 97530 THERAPEUTIC ACTIVITIES: CPT | Mod: GO

## 2021-04-30 PROCEDURE — 36415 COLL VENOUS BLD VENIPUNCTURE: CPT | Performed by: STUDENT IN AN ORGANIZED HEALTH CARE EDUCATION/TRAINING PROGRAM

## 2021-04-30 PROCEDURE — 250N000011 HC RX IP 250 OP 636: Performed by: NURSE PRACTITIONER

## 2021-04-30 PROCEDURE — 84100 ASSAY OF PHOSPHORUS: CPT | Performed by: STUDENT IN AN ORGANIZED HEALTH CARE EDUCATION/TRAINING PROGRAM

## 2021-04-30 PROCEDURE — 250N000013 HC RX MED GY IP 250 OP 250 PS 637: Performed by: NURSE PRACTITIONER

## 2021-04-30 PROCEDURE — 999N001017 HC STATISTIC GLUCOSE BY METER IP

## 2021-04-30 PROCEDURE — 120N000002 HC R&B MED SURG/OB UMMC

## 2021-04-30 PROCEDURE — 83735 ASSAY OF MAGNESIUM: CPT | Performed by: STUDENT IN AN ORGANIZED HEALTH CARE EDUCATION/TRAINING PROGRAM

## 2021-04-30 PROCEDURE — 99233 SBSQ HOSP IP/OBS HIGH 50: CPT | Mod: GC | Performed by: PSYCHIATRY & NEUROLOGY

## 2021-04-30 PROCEDURE — 250N000013 HC RX MED GY IP 250 OP 250 PS 637: Performed by: PSYCHIATRY & NEUROLOGY

## 2021-04-30 RX ORDER — VITAMIN B COMPLEX
1000 TABLET ORAL DAILY
Status: DISCONTINUED | OUTPATIENT
Start: 2021-04-30 | End: 2021-05-02 | Stop reason: HOSPADM

## 2021-04-30 RX ORDER — LOSARTAN POTASSIUM 100 MG/1
100 TABLET ORAL 2 TIMES DAILY
Status: CANCELLED
Start: 2021-04-30

## 2021-04-30 RX ORDER — ASCORBIC ACID 500 MG
500 TABLET ORAL DAILY
Status: DISCONTINUED | OUTPATIENT
Start: 2021-04-30 | End: 2021-05-02 | Stop reason: HOSPADM

## 2021-04-30 RX ORDER — TERAZOSIN 10 MG/1
10 CAPSULE ORAL AT BEDTIME
Status: DISCONTINUED | OUTPATIENT
Start: 2021-04-30 | End: 2021-05-02 | Stop reason: HOSPADM

## 2021-04-30 RX ORDER — LOSARTAN POTASSIUM 50 MG/1
50 TABLET ORAL 2 TIMES DAILY
Status: CANCELLED
Start: 2021-04-30

## 2021-04-30 RX ORDER — CARVEDILOL 6.25 MG/1
6.25 TABLET ORAL ONCE
Status: COMPLETED | OUTPATIENT
Start: 2021-04-30 | End: 2021-04-30

## 2021-04-30 RX ORDER — LOSARTAN POTASSIUM 50 MG/1
50 TABLET ORAL ONCE
Status: DISCONTINUED | OUTPATIENT
Start: 2021-04-30 | End: 2021-04-30

## 2021-04-30 RX ORDER — LOSARTAN POTASSIUM 50 MG/1
50 TABLET ORAL 2 TIMES DAILY
Status: DISCONTINUED | OUTPATIENT
Start: 2021-04-30 | End: 2021-05-01

## 2021-04-30 RX ORDER — HYDRALAZINE HYDROCHLORIDE 10 MG/1
10 TABLET, FILM COATED ORAL
Status: DISCONTINUED | OUTPATIENT
Start: 2021-04-30 | End: 2021-05-02 | Stop reason: HOSPADM

## 2021-04-30 RX ORDER — CARVEDILOL 12.5 MG/1
12.5 TABLET ORAL 2 TIMES DAILY WITH MEALS
Status: DISCONTINUED | OUTPATIENT
Start: 2021-04-30 | End: 2021-05-02 | Stop reason: HOSPADM

## 2021-04-30 RX ORDER — GLIMEPIRIDE 1 MG/1
1 TABLET ORAL 2 TIMES DAILY WITH MEALS
Status: DISCONTINUED | OUTPATIENT
Start: 2021-04-30 | End: 2021-05-02 | Stop reason: HOSPADM

## 2021-04-30 RX ORDER — LOSARTAN POTASSIUM 50 MG/1
100 TABLET ORAL 2 TIMES DAILY
Status: DISCONTINUED | OUTPATIENT
Start: 2021-04-30 | End: 2021-04-30

## 2021-04-30 RX ADMIN — Medication 1000 UNITS: at 09:04

## 2021-04-30 RX ADMIN — HEPARIN SODIUM 5000 UNITS: 5000 INJECTION, SOLUTION INTRAVENOUS; SUBCUTANEOUS at 20:23

## 2021-04-30 RX ADMIN — GLIMEPIRIDE 1 MG: 1 TABLET ORAL at 09:04

## 2021-04-30 RX ADMIN — HYDRALAZINE HYDROCHLORIDE 10 MG: 10 TABLET, FILM COATED ORAL at 10:14

## 2021-04-30 RX ADMIN — MELATONIN TAB 3 MG 3 MG: 3 TAB at 20:23

## 2021-04-30 RX ADMIN — HYDRALAZINE HYDROCHLORIDE 10 MG: 10 TABLET, FILM COATED ORAL at 17:19

## 2021-04-30 RX ADMIN — PRAVASTATIN SODIUM 20 MG: 10 TABLET ORAL at 20:23

## 2021-04-30 RX ADMIN — LOSARTAN POTASSIUM 50 MG: 50 TABLET, FILM COATED ORAL at 07:35

## 2021-04-30 RX ADMIN — HEPARIN SODIUM 5000 UNITS: 5000 INJECTION, SOLUTION INTRAVENOUS; SUBCUTANEOUS at 03:55

## 2021-04-30 RX ADMIN — FINASTERIDE 5 MG: 5 TABLET, FILM COATED ORAL at 07:35

## 2021-04-30 RX ADMIN — MAGNESIUM HYDROXIDE 30 ML: 400 SUSPENSION ORAL at 04:03

## 2021-04-30 RX ADMIN — HYDRALAZINE HYDROCHLORIDE 10 MG: 10 TABLET, FILM COATED ORAL at 18:54

## 2021-04-30 RX ADMIN — OXYCODONE HYDROCHLORIDE AND ACETAMINOPHEN 500 MG: 500 TABLET ORAL at 09:04

## 2021-04-30 RX ADMIN — LABETALOL HYDROCHLORIDE 20 MG: 5 INJECTION, SOLUTION INTRAVENOUS at 03:55

## 2021-04-30 RX ADMIN — LOSARTAN POTASSIUM 50 MG: 50 TABLET, FILM COATED ORAL at 20:23

## 2021-04-30 RX ADMIN — CARVEDILOL 12.5 MG: 12.5 TABLET, FILM COATED ORAL at 17:18

## 2021-04-30 RX ADMIN — HEPARIN SODIUM 5000 UNITS: 5000 INJECTION, SOLUTION INTRAVENOUS; SUBCUTANEOUS at 11:55

## 2021-04-30 RX ADMIN — POLYETHYLENE GLYCOL 3350 17 G: 17 POWDER, FOR SOLUTION ORAL at 07:35

## 2021-04-30 RX ADMIN — CARVEDILOL 6.25 MG: 6.25 TABLET, FILM COATED ORAL at 09:04

## 2021-04-30 RX ADMIN — METFORMIN HYDROCHLORIDE 1000 MG: 500 TABLET ORAL at 17:19

## 2021-04-30 RX ADMIN — GLIMEPIRIDE 1 MG: 1 TABLET ORAL at 17:19

## 2021-04-30 RX ADMIN — DOCUSATE SODIUM 50 MG AND SENNOSIDES 8.6 MG 2 TABLET: 8.6; 5 TABLET, FILM COATED ORAL at 07:35

## 2021-04-30 RX ADMIN — HYDRALAZINE HYDROCHLORIDE 20 MG: 20 INJECTION, SOLUTION INTRAMUSCULAR; INTRAVENOUS at 04:21

## 2021-04-30 RX ADMIN — PANTOPRAZOLE SODIUM 40 MG: 40 TABLET, DELAYED RELEASE ORAL at 07:35

## 2021-04-30 RX ADMIN — HYDRALAZINE HYDROCHLORIDE 10 MG: 10 TABLET, FILM COATED ORAL at 12:08

## 2021-04-30 RX ADMIN — LABETALOL HYDROCHLORIDE 20 MG: 5 INJECTION, SOLUTION INTRAVENOUS at 00:23

## 2021-04-30 RX ADMIN — METFORMIN HYDROCHLORIDE 1000 MG: 500 TABLET ORAL at 09:04

## 2021-04-30 RX ADMIN — TERAZOSIN HYDROCHLORIDE 10 MG: 10 CAPSULE ORAL at 21:48

## 2021-04-30 RX ADMIN — HYDRALAZINE HYDROCHLORIDE 10 MG: 20 INJECTION, SOLUTION INTRAMUSCULAR; INTRAVENOUS at 00:40

## 2021-04-30 RX ADMIN — CARVEDILOL 6.25 MG: 6.25 TABLET, FILM COATED ORAL at 07:35

## 2021-04-30 RX ADMIN — DOCUSATE SODIUM 50 MG AND SENNOSIDES 8.6 MG 2 TABLET: 8.6; 5 TABLET, FILM COATED ORAL at 20:23

## 2021-04-30 RX ADMIN — ACETAMINOPHEN 650 MG: 325 TABLET, FILM COATED ORAL at 20:23

## 2021-04-30 RX ADMIN — AMLODIPINE BESYLATE 10 MG: 10 TABLET ORAL at 07:35

## 2021-04-30 ASSESSMENT — ACTIVITIES OF DAILY LIVING (ADL)
ADLS_ACUITY_SCORE: 18

## 2021-04-30 NOTE — PLAN OF CARE
Status: admitted for cerebellar hemorrhage  Vitals: VSS on RA ex HTN- meds given  Neuros: A&Ox4. Denies N/T. 5/5 throughout. Double/blurry vision. [place]  IV: PIV SL  Diet: Regular  Bowel status: BS+ no BM overnight. Pt requested Milk of Mag and was given 0430  : lin in place with good output  Skin: R groin site CDI, generalized bruising  Pain: denies  Activity: A1 with GB and walker  Plan: continue to monitor and follow POC. discharge to ARU when medically ready  Updates this shift: BP meds given, Milk of Mag given

## 2021-04-30 NOTE — PLAN OF CARE
PT 6A: Cancel. Pt hypertensive and not appropriate for treatment within given parameters. Reschedule for tomorrow.

## 2021-04-30 NOTE — PROGRESS NOTES
[facility]    Stroke Progress Note    Interval Events  Patient transferred to the [facility] yesterday. No acute events since transfer. Patient still hypertensive requiring IV prn medications. Today he has no complaints. Reports he is eating and drinking well. Less bowel movements than normal however did receive MOM this morning.    Patient will be ready to discharge when blood pressure is better controlled with PO medications. Hopeful for tomorrow.      Impression  Intracerebral Hemorrhage    Plan  #Cerebellar vermis hematoma  #Subarachnoid hemorrhage  #Intraventricular hemorrhage, stable  - Angiogram negative for aneurysm, suspect etiology of bleed is hypertension  - SBP goal < 180, PO hydralazine q1h prn  - Holding ASA   - Repeat MRI brain with and without contrast outpatient in 3-6 months    #Hypertension  - Losartan 50 mg BID  - Increase coreg to 12.5 mg BID  - Amlodipine 10 mg daily  - PO hydralazine prn for SBP greater than 180    #Constipation, chronic  - Scheduled Miralax daily  - Scheduled senna-docusate BID  - MOM and suppository as needed    #BPH  Patient with urinary retention during hospitalization. Urology was consulted to place lin after several attempts were made bedside.   - Terazosin 10 mg at bedtime  - Finasteride 5 mg daily  - Indwelling lin in place  - Follow up with Urology in 1 week outpatient for voiding trail    #Type 2 DM  - Restart home medications amaryl and metoformin  - High insulin sliding scale  - Hypoglycemia protocol    DVT ppx: SCDs, heparin subcutaneous  Code: FULL      The patient was discussed with Stroke Fellow, Dr. Higginbotham.  The Stroke Staff is Dr. Francisco.    Jeni Mackey MD  Neurology Resident  Pager:  [phone]  ______________________________________________________    Medications   Home Meds  Prior to Admission medications    Medication Sig Start Date End Date Taking? Authorizing Provider   albuterol (PROAIR HFA/PROVENTIL  HFA/VENTOLIN HFA) 108 (90 Base) MCG/ACT inhaler Inhale 2 puffs into the lungs every 4 hours as needed 3/23/21  Yes Reported, Patient   amLODIPine (NORVASC) 5 MG tablet Take 5 mg by mouth daily In evening 12/16/20  Yes Reported, Patient   azelastine (ASTELIN) 0.1 % nasal spray Spray 1 spray in nostril 2 times daily 4/22/21 4/22/22 Yes Reported, Patient   finasteride (PROSCAR) 5 MG tablet Take 5 mg by mouth daily 12/16/20  Yes Reported, Patient   losartan (COZAAR) 25 MG tablet Take 25 mg by mouth 2 times daily 12/16/20  Yes Reported, Patient   metFORMIN (GLUCOPHAGE) 1000 MG tablet Take 1,000 mg by mouth 2 times daily (with meals) 12/16/20  Yes Reported, Patient   omeprazole (PRILOSEC) 40 MG DR capsule Take 40 mg by mouth daily 12/16/20  Yes Reported, Patient   pantoprazole (PROTONIX) 40 MG EC tablet Take 40 mg by mouth daily 4/12/21 4/12/22 Yes Reported, Patient   pravastatin (PRAVACHOL) 20 MG tablet Take 20 mg by mouth daily 4/12/21 4/12/22 Yes Reported, Patient   terazosin (HYTRIN) 10 MG capsule Take 10 mg by mouth At Bedtime 12/16/20  Yes Reported, Patient   aspirin (ASA) 81 MG EC tablet Take 81 mg by mouth daily    Reported, Patient   cholecalciferol 25 MCG (1000 UT) TABS Take 1,000 Units by mouth daily    Reported, Patient   docusate sodium (DSS) 100 MG capsule Take 200 mg by mouth daily With supper    Reported, Patient   FERROUS FUMARATE PO Take 1 tablet by mouth daily    Reported, Patient   glimepiride (AMARYL) 1 MG tablet Take 1 mg by mouth 2 times daily (with meals)    Reported, Patient   ibuprofen (ADVIL/MOTRIN) 200 MG tablet Take 400 mg by mouth every 4 hours as needed    Reported, Patient   Multiple Vitamin (THERA-TABS) TABS Take 1 tablet by mouth daily    Reported, Patient   polyethylene glycol (MIRALAX) 17 GM/Dose powder Take 17 g by mouth daily    Reported, Patient   triamcinolone (NASACORT ALLERGY 24HR) 55 MCG/ACT nasal aerosol Spray 1 spray in nostril daily    Reported, Patient   vitamin C (ASCORBIC  ACID) 500 MG tablet Take 500 mg by mouth daily    Reported, Patient       Scheduled Meds    amLODIPine  10 mg Oral or Feeding Tube Daily     bisacodyl  10 mg Rectal Once     carvedilol  6.25 mg Oral BID w/meals     finasteride  5 mg Oral or Feeding Tube Daily     heparin ANTICOAGULANT  5,000 Units Subcutaneous Q8H     insulin aspart  1-10 Units Subcutaneous TID AC     insulin aspart  1-7 Units Subcutaneous At Bedtime     losartan  50 mg Oral or Feeding Tube BID     pantoprazole  40 mg Oral QAM AC     polyethylene glycol  17 g Oral or Feeding Tube Daily     pravastatin  20 mg Oral Daily at 8 pm     senna-docusate  2 tablet Oral or Feeding Tube BID       Infusion Meds      PRN Meds  acetaminophen, glucose **OR** dextrose **OR** glucagon, hydrALAZINE, labetalol, magnesium hydroxide, melatonin       PHYSICAL EXAMINATION  Temp:  [96.3  F (35.7  C)-99.6  F (37.6  C)] 96.8  F (36  C)  Pulse:  [64-86] 72  Resp:  [10-39] 16  BP: (145-191)/(59-92) 148/65  SpO2:  [93 %-98 %] 95 %     General:  patient lying in bed without any acute distress    HEENT:  normocephalic/atraumatic, no oral lesions  Cardio:  RRR  Pulmonary:  no respiratory distress  Abdomen:  soft, non-tender  Extremities:  no edema  Skin:  intact, warm/dry     Neurologic  Mental Status:  alert, oriented x 3, follows commands, speech clear and fluent, naming and repetition normal  Cranial Nerves:  visual fields intact, PERRL, EOMI with normal smooth pursuit, facial sensation intact and symmetric, facial movements symmetric, hearing not formally tested but intact to conversation, palate elevation symmetric and uvula midline, no dysarthria, shoulder shrug strong bilaterally, tongue protrusion midline  Motor:  normal muscle tone and bulk, no abnormal movements, able to move all limbs spontaneously, strength 5/5 throughout upper and lower extremities, no pronator drift  Reflexes:  toes down-going  Sensory:  light touch sensation intact and symmetric throughout upper and  lower extremities  Coordination:  normal finger-to-nose and heel-to-shin bilaterally without dysmetria  Station/Gait:  deferred     Imaging  I personally reviewed all imaging; relevant findings per HPI.     Lab Results Data   CBC  Recent Labs   Lab 04/29/21  0454 04/28/21  0440 04/27/21  0737   WBC 8.9 8.8 10.7   RBC 4.06* 4.15* 4.11*   HGB 12.0* 12.0* 12.4*   HCT 35.4* 36.7* 36.6*    227 228     Basic Metabolic Panel    Recent Labs   Lab 04/29/21  1020 04/29/21  0454 04/28/21  0440 04/27/21  0737   NA  --  130* 132* 130*   POTASSIUM 3.8 3.4 3.9 3.4   CHLORIDE  --  97 102 100   CO2  --  26 25 24   BUN  --  21 18 17   CR  --  0.92 0.99 0.85   GLC  --  175* 166* 175*   PRABHU  --  8.7 8.3* 8.3*     Liver Panel  No results for input(s): PROTTOTAL, ALBUMIN, BILITOTAL, ALKPHOS, AST, ALT, BILIDIRECT in the last 168 hours.  INR    Recent Labs   Lab Test 04/24/21 2237   INR 1.03      Lipid Profile    Recent Labs   Lab Test 04/26/21  0407   LDL 58     A1C    Recent Labs   Lab Test 04/27/21  0737 04/24/21 2237   A1C 7.4* 7.3*     Troponin I  No results for input(s): TROPI in the last 168 hours.

## 2021-04-30 NOTE — PLAN OF CARE
Your risk factors for stroke or TIA (transient ischemic attack):    Your Risk Factors Your Results Normal Ranges   High blood pressure BP Readings from Last 1 Encounters:   04/30/21 (!) 165/72    Less than 120/80   Cholesterol              Total No results found for: CHOL   Less than 150    Triglycerides   No results found for: TRIG Less than 150   LDL Lab Results   Component Value Date    LDL 58 04/26/2021       Less than 70   HDL No results found for: HDL         Greater than 40 (men)  Greater than 50 (women)   Diabetes Recent Labs   Lab 04/30/21  0713   *    Fasting blood glucose    Smoking/tobacco use  Quit smoking and tobacco   Overweight  Lose 1-2 pounds a week   Lack of exercise  30 minutes moderate activity each day   Other risk factors include carotid (neck) artery disease, atrial fibrillation and stress. You may be on new medicine to treat high blood pressure, cholesterol, diabetes or atrial fibrillation.    Understanding Stroke Booklet given to patient. Please refer to booklet for further information.    Stroke warning signs and symptoms - CALL 911 right away for:  - Sudden numbness or weakness in the face, arm or leg (often on one side of the body).  - Sudden confusion or trouble understanding what is going on.  - Sudden blurred or decreased vision in one or both eyes.  - Sudden trouble speaking, loss of balance, dizziness or problems with coordination.  - Sudden, severe headache for no reason.  - Fainting or seizures.  - Symptoms may go away then come back suddenly.

## 2021-04-30 NOTE — PLAN OF CARE
Successfully completed stroke teach-back, Session 1. Patient/family has the Stroke Handbook and/or handouts from the PLC stroke class.  Educated patient on signs and symptoms of stroke, risk factors, health habits, and medications to prevent another stroke. Handouts given on personal risk factors and medications, stroke book at bedside.

## 2021-04-30 NOTE — PROGRESS NOTES
Care Management Discharge Note    Discharge Date: 05/01/21       Discharge Disposition:  [facility]  [address], [place] [address]  F: [phone]  [phone]    Discharge Services:  Short term TCU placement at [facility]    Discharge DME:  Not applicable    Discharge Transportation: JOSE spoke with pt's floor nurse (Bertha) who states that pt would be appropriate to be transported by w/c via non emergency medical transport company or by car with family.  JOSE spoke with pt's wife who states that she and pt's son (George and or En) will provide pt's transport with plan to pick pt up at 11am ([facility] has requested that pt arrive no later than 1:30pm).      Private pay costs discussed:  Not applicable    PAS Confirmation Code:   [id number].  Patient/family educated on Medicare website which has current facility and service quality ratings: yes    Education Provided on the Discharge Plan:  yes  Persons Notified of Discharge Plans: pt, wife, 6A nursing and Dr. Bess  Patient/Family in Agreement with the Plan:  Yes    Handoff Referral Completed: Yes    Additional Information:  - Dr. Bess has confirmed that pt will be ready for discharge on 5/1/2021  - Admissions (Kely) at [facility] has confirmed acceptance for admit  - IMM completed.      NIXON West  Social Work, 6A  Phone:  [phone]  Pager:  [phone]  4/30/2021            MINERVA Rowland

## 2021-04-30 NOTE — DISCHARGE SUMMARY
[facility]    Neurology Stroke Discharge Summary    Date of Admission: 4/24/2021  Date of Discharge: 05/02/2021    Disposition: Discharged to short-term care facility  Primary Care Physician: Adi Mariano      Admission Diagnosis:   Acute cerebellar hemorrhage, ICH score 2    Discharge Diagnosis:   Cerebellar vermis hematoma  Hypertension  Urinary retention    Problem Leading to Hospitalization (from HPI):   Ronen Small is a 85 year old year old with PMH of HTN, KAPIL and DM who was admitted for ICH in the setting of hypertension.    Please see H&P dated 4/24/2021 for further details about presentation.    Brief Hospital Course:   Patient presented with nausea and dizziness while playing cards. He was taken to [facility] where he was found to be hypertensive with CT head demonstrating cerebellar hemorrhage. Patient was started on a nicardipine gtt for BP control and transferred to [facility] for further management of intracranial bleed. Repeat imaging upon arrival demonstrated acute intraparenchymal hemorrhage of the cerebellum measuring 2.7 cm; partial effacement of the fourth ventricle. CTA head and neck demonstrated possibe small aneurysm versus a venous structure in the distal left PCA therefore patient was evaluated by neuro IR. A diagnostic cerebral angiogram was completed that did not demonstrate any underlying vascular anomalies in the anterior or posterior circulation. Etiology thought to be secondary to hypertension.    During the hospital stay repeat imaging demonstrated stability of cerebellar hemorrhage. A CT head should be completed outpatient in 2 weeks, followed by a neurology clinic visit. At that time, discussion can be had about restarting ASA 81 mg.    Hospital course was complicated by urinary retention. Bedside PVR with high volumes requiring placement of Garcia catheter. Urology was called in to place catheter after several failed  bedside attempts. This should stay in for 1 week, and patient will follow up outpatient for a voiding trial.    PT/OT evaluated patient and recommended acute rehab on discharge. Patient and family elected [facility] in [place]. This is considered a TCU, however therapies determined that this would still be an appropriate option.     Patient is a non-smoker. Long term blood pressure goal is less than 140/90. Patient is currently on lisinopril, coreg, and amlodipine that can be further titrated on an outpatient basis. If patient were to develop a cough on lisinopril, suggest going back to losartan 50 mg BID and up-titrating carvedilol for blood pressure control. It will be very important that patient has better control of blood pressure given this is the likely etiology of his cerebellar hemorrhage. Patient was on ASA 81 mg prior to arrival, this should be held for 2 weeks in the setting of bleed. Can be restarted on ASA if repeat head CT in 2 weeks is stable. PTA pravastatin can be continued. Mr. Small is a diabetic patient currently on metformin and amaryl. Recommend Hgb A1c is less than 7.0.    Other problems addressed during this hospitalization:  #Hypertension  - Lisinopril 10 mg BID  - Coreg to 12.5 mg BID  - Amlodipine 10 mg daily  - Adjustments can be made outpatient as above     #Constipation, chronic  - Scheduled Miralax daily  - Scheduled senna-docusate BID  - MOM and suppository as needed     #BPH  Patient with urinary retention during hospitalization. Urology was consulted to place lin after several attempts were made bedside.   - Terazosin 10 mg at bedtime  - Finasteride 5 mg daily  - Indwelling lin in place  - Follow up with Urology in 1 week outpatient for voiding trail     #Type 2 DM  - Continue pta amaryl and metformin  - Started on sliding scale insulin while inpatient.    PERTINENT INVESTIGATIONS    Labs  Lipid Panel:   Recent Labs   Lab Test 04/26/21  0407   LDL 58     A1C:   Lab Results    Component Value Date    A1C 7.4 04/27/2021     INR:   No lab results found in last 7 days.   Coag Panel / Hypercoag Workup: Not indicated  Pending test results: None    Echo:   Interpretation Summary  Global and regional left ventricular function is hyperkinetic with an EF of  65-70%.  Right ventricular function, chamber size, wall motion, and thickness are  normal.  No significant valvular abnormalities were noted.  No pericardial effusion is present.    CT HEAD W/O CONTRAST 4/25/2021 1:31 AM  Impression:  1. Acute intraparenchymal hemorrhage of the cerebellum measuring 2.7  cm. Partial effacement of the fourth ventricle.  2. Mild generalized parenchymal volume loss and sequelae of chronic  small vessel ischemic disease.    CT HEAD W/O CONTRAST 4/25/2021 10:01 AM  Impression:  Marginal increase in intraparenchymal hemorrhage of the left midline  cerebellum with similar partial effacement of the fourth ventricle.    CTA  HEAD NECK WITH CONTRAST 4/25/2021 10:01 AM  Impression:    1. 3 mm vascular outpouching medial to distal left PCA, possibly a  small aneurysm versus a venous structure. This could potentially be  the source of the posterior fossa hemorrhage. Please note that the  left vertebral artery arises directly from the aortic arch.  2. No large vessel occlusion or other sizable aneurysm within the  intracerebral vasculature.  3. No hemodynamically suggesting stenosis or occlusion of the neck  vasculature.    CT HEAD W/O CONTRAST 4/25/2021 6:00 PM  Impression: No significant change in intraparenchymal hemorrhage of  the left midline cerebellum with partial effacement of the fourth  Ventricle.    MRI brain without and with contrast  MRA of the head without contrast  Neck MRA without and with contrast  Impression:  1. Punctate foci of acute and subacute infarcts scattered to the  bilateral cerebral hemispheres as described above.  2. Unchanged midline cerebellar intraparenchymal hemorrhage.  3. Moderate  generalized parenchymal volume loss and leukoaraiosis.  3. Head MRA demonstrates no definite aneurysm or stenosis of the major  intracranial arteries.  4. Neck MRA demonstrates patent major cervical arteries.     Endovascular procedure: diagnostive cerebral angiography     Cardiac Monitoring: Patient had > 24 hrs of cardiac monitor while in hospital.    Findings: No atrial fibrillation was found.    Sleep Apnea Screen:   Questions/Answers      1. Prior to your stroke, have you been told that you snore? Yes.    2. Prior to your stroke, have you been told that you struggle to breath while you are sleeping? No.    3. Prior to your stroke, do you feel tired and sleepy even after getting a normal night of sleep? No.    Sleep Apnea Screen Findings: Patient has 0-1 symptoms of sleep apnea.  Further sleep study is not recommended at this time.    PHQ-9 Depression Screen Score: 0    Education discussed with: patient and spouse on blood pressure management, cholesterol management, medical management, diet modification, follow-up recommendations/plan and 911 call if warning signs of stroke.    During daily rounds, the plan of care was discussed and developed with patient.  Plan of care includes: blood pressure management, follow up for cerebellar hematoma.    PHYSICAL EXAMINATION  Vital Signs:  B/P: 148/65, T: 96.8, P: 72, R: 16    General:  awake, no acute distress   HEENT:  normocephalic/atraumatic  Cardio:  RRR  Pulmonary:  no respiratory distress  Abdomen:  soft, bowel sounds present  Extremities:  no edema  Skin:  intact      Neurologic  Mental Status:  alert, oriented x 3, follows commands, speech clear and fluent, naming and repetition normal  Cranial Nerves:  visual fields intact, PERRL, EOMI with normal smooth pursuit, facial movements symmetric, hearing not formally tested but intact to conversation, no dysarthria, tongue protrusion midline  Motor:  normal muscle tone and bulk, no abnormal movements, able to move all  limbs spontaneously, strength 5/5 throughout upper and lower extremities, no pronator drift  Reflexes:  not assessed  Sensory:  light touch sensation intact and symmetric throughout upper and lower extremities, no extinction on double simultaneous stimulation   Coordination:  equivocal ataxia with FNF on left  Station/Gait:  patient seen ambulating in the hallway with walker    National Institutes of Health Stroke Scale (on day of discharge)  NIHSS Total Score: 0    Modified Yenni Scale (on day of discharge): 2-Slight disability; unable to carry out all previous activities, but able to look after own affairs    Medications    Current Discharge Medication List      START taking these medications    Details   benzocaine-menthol (CEPACOL) 15-3.6 MG lozenge Place 1 lozenge inside cheek every hour as needed for sore throat  Qty:      Associated Diagnoses: Nontraumatic intracerebral hemorrhage of cerebellum, unspecified laterality (H)      carvedilol (COREG) 12.5 MG tablet Take 1 tablet (12.5 mg) by mouth 2 times daily (with meals)    Comments: For high blood pressure  Associated Diagnoses: Nontraumatic intracerebral hemorrhage of cerebellum, unspecified laterality (H)      lisinopril (ZESTRIL) 10 MG tablet Take 1 tablet (10 mg) by mouth 2 times daily  Qty:      Comments: For high blood pressure  Associated Diagnoses: Nontraumatic intracerebral hemorrhage of cerebellum, unspecified laterality (H)      senna-docusate (SENOKOT-S/PERICOLACE) 8.6-50 MG tablet 2 tablets by Oral or Feeding Tube route 2 times daily  Refills: 0    Comments: For constipation  Associated Diagnoses: Nontraumatic intracerebral hemorrhage of cerebellum, unspecified laterality (H)         CONTINUE these medications which have CHANGED    Details   albuterol (PROAIR HFA/PROVENTIL HFA/VENTOLIN HFA) 108 (90 Base) MCG/ACT inhaler Inhale 2 puffs into the lungs every 4 hours as needed for shortness of breath / dyspnea    Comments: Pharmacy may dispense brand  covered by insurance (Proair, or proventil or ventolin or generic albuterol inhaler)  Associated Diagnoses: Nontraumatic intracerebral hemorrhage of cerebellum, unspecified laterality (H)      amLODIPine (NORVASC) 10 MG tablet 1 tablet (10 mg) by Oral or Feeding Tube route daily    Comments: For high blood pressure  Associated Diagnoses: Nontraumatic intracerebral hemorrhage of cerebellum, unspecified laterality (H)      azelastine (ASTELIN) 0.1 % nasal spray Spray 1 spray in nostril 2 times daily    Comments: For allergies  Associated Diagnoses: Nontraumatic intracerebral hemorrhage of cerebellum, unspecified laterality (H)      cholecalciferol 25 MCG (1000 UT) TABS Take 1,000 Units by mouth daily    Comments: For nutritional supplement  Associated Diagnoses: Nontraumatic intracerebral hemorrhage of cerebellum, unspecified laterality (H)      finasteride (PROSCAR) 5 MG tablet 1 tablet (5 mg) by Oral or Feeding Tube route daily    Comments: For BPH  Associated Diagnoses: Nontraumatic intracerebral hemorrhage of cerebellum, unspecified laterality (H)      glimepiride (AMARYL) 1 MG tablet Take 1 tablet (1 mg) by mouth 2 times daily (with meals)    Comments: For diabetes  Associated Diagnoses: Nontraumatic intracerebral hemorrhage of cerebellum, unspecified laterality (H)      ibuprofen (ADVIL/MOTRIN) 200 MG tablet Take 2 tablets (400 mg) by mouth every 4 hours as needed for mild pain    Associated Diagnoses: Nontraumatic intracerebral hemorrhage of cerebellum, unspecified laterality (H)      metFORMIN (GLUCOPHAGE) 1000 MG tablet Take 1 tablet (1,000 mg) by mouth 2 times daily (with meals)    Comments: For diabetes  Associated Diagnoses: Nontraumatic intracerebral hemorrhage of cerebellum, unspecified laterality (H)      Multiple Vitamin (THERA-TABS) TABS Take 1 tablet by mouth daily    Comments: For nutritional supplementation  Associated Diagnoses: Nontraumatic intracerebral hemorrhage of cerebellum, unspecified  laterality (H)      omeprazole (PRILOSEC) 40 MG DR capsule Take 1 capsule (40 mg) by mouth daily    Comments: For GERD  Associated Diagnoses: Nontraumatic intracerebral hemorrhage of cerebellum, unspecified laterality (H)      polyethylene glycol (MIRALAX) 17 GM/Dose powder Take 17 g by mouth daily    Comments: For constipation  Associated Diagnoses: Nontraumatic intracerebral hemorrhage of cerebellum, unspecified laterality (H)      pravastatin (PRAVACHOL) 20 MG tablet Take 1 tablet (20 mg) by mouth daily    Comments: For high cholesterol  Associated Diagnoses: Nontraumatic intracerebral hemorrhage of cerebellum, unspecified laterality (H)      terazosin (HYTRIN) 10 MG capsule Take 1 capsule (10 mg) by mouth At Bedtime    Comments: For BPH  Associated Diagnoses: Nontraumatic intracerebral hemorrhage of cerebellum, unspecified laterality (H)      triamcinolone (NASACORT ALLERGY 24HR) 55 MCG/ACT nasal aerosol Spray 1 spray in nostril daily    Comments: For allergies  Associated Diagnoses: Nontraumatic intracerebral hemorrhage of cerebellum, unspecified laterality (H)      vitamin C (ASCORBIC ACID) 500 MG tablet Take 1 tablet (500 mg) by mouth daily    Comments: For nutritional supplementation  Associated Diagnoses: Nontraumatic intracerebral hemorrhage of cerebellum, unspecified laterality (H)         STOP taking these medications       aspirin (ASA) 81 MG EC tablet Comments:   Reason for Stopping:         losartan (COZAAR) 25 MG tablet Comments:   Reason for Stopping:         pantoprazole (PROTONIX) 40 MG EC tablet Comments:   Reason for Stopping:               Additional recommendations and follow up:       MRI Brain w & w/o contrast     CT Head w/o contrast*     Neurology Adult Referral      UROLOGY ADULT REFERRAL      General info for SNF    Length of Stay Estimate: Short Term Care: Estimated # of Days <30  Condition at Discharge: Stable  Level of care:skilled   Rehabilitation Potential: Good  Admission H&P  remains valid and up-to-date: Yes  Recent Chemotherapy: N/A  Use Nursing Home Standing Orders: N/A     Mantoux instructions    Give two-step Mantoux (PPD) Per Facility Policy Yes     Reason for your hospital stay    You were hospitalized for a bleed in the back part of your brain. We suspect this was due to persistently high blood pressures. There was no evidence for aneurysm on angiogram completed. You should continue to work on your blood pressure with your primary care on discharge. In addition, you should hold your aspirin in the setting of recent bleed. You can have a repeat head CT in 2 weeks to monitor stability of bleed, and discuss restarting Aspirin at that time. Finally, you should follow up with Urology 1 week from discharge to remove the lin catheter and have a voiding trial.     If you develop new or worsening symptoms, please return to the ED.     Lin catheter    To straight gravity drainage. Change catheter every 2 weeks and PRN for leaking or decreased uring output with signs of bladder distention. DO NOT change catheter without a specific MD order IF diagnosis of benign prostatic hypertrophy (BPH), neurogenic bladder, or other urological conditions     Activity - Up with assistive device     Follow Up and recommended labs and tests    Follow up with primary care provider in 1 weeks.  No follow up labs or test are needed. Discuss blood pressure medications.  Follow up with neurology in 2 weeks, with repeat head CT to discuss restarting Aspirin.  Follow up with urology in 1 week for a voiding trail after lin is removed.     Full Code     Physical Therapy Adult Consult    Evaluate and treat as clinically indicated.    Reason:  [facility]     Occupational Therapy Adult Consult    Evaluate and treat as clinically indicated.    Reason:  [facility]     Fall precautions     Advance Diet as Tolerated    Follow this diet upon discharge: Orders Placed This Encounter      Advance Diet as Tolerated: Regular Diet Adult        Patient was seen and discussed with the Attending, Dr. Francisco.    Jeni Mackey MD  Pager: [phone]

## 2021-04-30 NOTE — PLAN OF CARE
Status: Patient admitted on 4/24 with intracerebral hemorrhage  Vitals: Hypertensive throughout morning, PO BP medications increased without effect, 2 doses of PO 10mg Hydralazine given with fair effect, last /72  Neuros: A&Ox4, diplopia/blurred vision, nystagmus, [place] IRA  IV: PIV saline locked  Labs/Electrolytes: WNL  Resp/trach: Lung sounds clear throughout  Diet: Regular, fair PO intake  Bowel status: BM today after Milk of Magnesia, Miralax, and Senna  : Voiding spontaneously  Skin: Generalized bruising throughout  Pain: Denies  Activity: Up with A1, walker, gaitbelt  Social: Calm and cooperative with cares  Plan: Discharge home tomorrow at 1100 if BP remains under control with PO medications

## 2021-05-01 ENCOUNTER — APPOINTMENT (OUTPATIENT)
Dept: OCCUPATIONAL THERAPY | Facility: CLINIC | Age: 86
DRG: 064 | End: 2021-05-01
Attending: PSYCHIATRY & NEUROLOGY
Payer: MEDICARE

## 2021-05-01 LAB
ANION GAP SERPL CALCULATED.3IONS-SCNC: 6 MMOL/L (ref 3–14)
BUN SERPL-MCNC: 20 MG/DL (ref 7–30)
CALCIUM SERPL-MCNC: 8.4 MG/DL (ref 8.5–10.1)
CHLORIDE SERPL-SCNC: 99 MMOL/L (ref 94–109)
CO2 SERPL-SCNC: 28 MMOL/L (ref 20–32)
CREAT SERPL-MCNC: 0.93 MG/DL (ref 0.66–1.25)
ERYTHROCYTE [DISTWIDTH] IN BLOOD BY AUTOMATED COUNT: 13.4 % (ref 10–15)
GFR SERPL CREATININE-BSD FRML MDRD: 75 ML/MIN/{1.73_M2}
GLUCOSE BLDC GLUCOMTR-MCNC: 148 MG/DL (ref 70–99)
GLUCOSE BLDC GLUCOMTR-MCNC: 242 MG/DL (ref 70–99)
GLUCOSE BLDC GLUCOMTR-MCNC: 74 MG/DL (ref 70–99)
GLUCOSE SERPL-MCNC: 200 MG/DL (ref 70–99)
HCT VFR BLD AUTO: 36.2 % (ref 40–53)
HGB BLD-MCNC: 12.2 G/DL (ref 13.3–17.7)
MCH RBC QN AUTO: 30.2 PG (ref 26.5–33)
MCHC RBC AUTO-ENTMCNC: 33.7 G/DL (ref 31.5–36.5)
MCV RBC AUTO: 90 FL (ref 78–100)
PLATELET # BLD AUTO: 256 10E9/L (ref 150–450)
POTASSIUM SERPL-SCNC: 3.7 MMOL/L (ref 3.4–5.3)
RBC # BLD AUTO: 4.04 10E12/L (ref 4.4–5.9)
SODIUM SERPL-SCNC: 133 MMOL/L (ref 133–144)
WBC # BLD AUTO: 6.8 10E9/L (ref 4–11)

## 2021-05-01 PROCEDURE — 250N000013 HC RX MED GY IP 250 OP 250 PS 637: Performed by: STUDENT IN AN ORGANIZED HEALTH CARE EDUCATION/TRAINING PROGRAM

## 2021-05-01 PROCEDURE — 250N000011 HC RX IP 250 OP 636: Performed by: STUDENT IN AN ORGANIZED HEALTH CARE EDUCATION/TRAINING PROGRAM

## 2021-05-01 PROCEDURE — 36415 COLL VENOUS BLD VENIPUNCTURE: CPT | Performed by: STUDENT IN AN ORGANIZED HEALTH CARE EDUCATION/TRAINING PROGRAM

## 2021-05-01 PROCEDURE — 250N000013 HC RX MED GY IP 250 OP 250 PS 637: Performed by: NURSE PRACTITIONER

## 2021-05-01 PROCEDURE — 80048 BASIC METABOLIC PNL TOTAL CA: CPT | Performed by: STUDENT IN AN ORGANIZED HEALTH CARE EDUCATION/TRAINING PROGRAM

## 2021-05-01 PROCEDURE — 120N000002 HC R&B MED SURG/OB UMMC

## 2021-05-01 PROCEDURE — 97535 SELF CARE MNGMENT TRAINING: CPT | Mod: GO

## 2021-05-01 PROCEDURE — 99232 SBSQ HOSP IP/OBS MODERATE 35: CPT | Mod: GC | Performed by: PSYCHIATRY & NEUROLOGY

## 2021-05-01 PROCEDURE — 85027 COMPLETE CBC AUTOMATED: CPT | Performed by: STUDENT IN AN ORGANIZED HEALTH CARE EDUCATION/TRAINING PROGRAM

## 2021-05-01 PROCEDURE — 999N001017 HC STATISTIC GLUCOSE BY METER IP

## 2021-05-01 RX ORDER — LISINOPRIL 10 MG/1
10 TABLET ORAL 2 TIMES DAILY
Status: DISCONTINUED | OUTPATIENT
Start: 2021-05-01 | End: 2021-05-02 | Stop reason: HOSPADM

## 2021-05-01 RX ADMIN — PRAVASTATIN SODIUM 20 MG: 10 TABLET ORAL at 20:28

## 2021-05-01 RX ADMIN — LOSARTAN POTASSIUM 50 MG: 50 TABLET, FILM COATED ORAL at 07:59

## 2021-05-01 RX ADMIN — LISINOPRIL 10 MG: 10 TABLET ORAL at 12:36

## 2021-05-01 RX ADMIN — MELATONIN TAB 3 MG 3 MG: 3 TAB at 20:36

## 2021-05-01 RX ADMIN — CARVEDILOL 12.5 MG: 12.5 TABLET, FILM COATED ORAL at 17:40

## 2021-05-01 RX ADMIN — HEPARIN SODIUM 5000 UNITS: 5000 INJECTION, SOLUTION INTRAVENOUS; SUBCUTANEOUS at 04:17

## 2021-05-01 RX ADMIN — BENZOCAINE AND MENTHOL 1 LOZENGE: 15; 3.6 LOZENGE ORAL at 01:30

## 2021-05-01 RX ADMIN — Medication 1000 UNITS: at 07:59

## 2021-05-01 RX ADMIN — HEPARIN SODIUM 5000 UNITS: 5000 INJECTION, SOLUTION INTRAVENOUS; SUBCUTANEOUS at 20:28

## 2021-05-01 RX ADMIN — OMEPRAZOLE 40 MG: 20 CAPSULE, DELAYED RELEASE ORAL at 07:59

## 2021-05-01 RX ADMIN — BENZOCAINE AND MENTHOL 1 LOZENGE: 15; 3.6 LOZENGE ORAL at 22:09

## 2021-05-01 RX ADMIN — ACETAMINOPHEN 650 MG: 325 TABLET, FILM COATED ORAL at 01:30

## 2021-05-01 RX ADMIN — ACETAMINOPHEN 650 MG: 325 TABLET, FILM COATED ORAL at 20:28

## 2021-05-01 RX ADMIN — HEPARIN SODIUM 5000 UNITS: 5000 INJECTION, SOLUTION INTRAVENOUS; SUBCUTANEOUS at 13:28

## 2021-05-01 RX ADMIN — AMLODIPINE BESYLATE 10 MG: 10 TABLET ORAL at 07:57

## 2021-05-01 RX ADMIN — METFORMIN HYDROCHLORIDE 1000 MG: 500 TABLET ORAL at 07:59

## 2021-05-01 RX ADMIN — METFORMIN HYDROCHLORIDE 1000 MG: 500 TABLET ORAL at 17:40

## 2021-05-01 RX ADMIN — ACETAMINOPHEN 650 MG: 325 TABLET, FILM COATED ORAL at 07:59

## 2021-05-01 RX ADMIN — FINASTERIDE 5 MG: 5 TABLET, FILM COATED ORAL at 07:57

## 2021-05-01 RX ADMIN — GLIMEPIRIDE 1 MG: 1 TABLET ORAL at 07:59

## 2021-05-01 RX ADMIN — OXYCODONE HYDROCHLORIDE AND ACETAMINOPHEN 500 MG: 500 TABLET ORAL at 07:59

## 2021-05-01 RX ADMIN — LISINOPRIL 10 MG: 10 TABLET ORAL at 20:28

## 2021-05-01 RX ADMIN — GLIMEPIRIDE 1 MG: 1 TABLET ORAL at 17:40

## 2021-05-01 RX ADMIN — TERAZOSIN HYDROCHLORIDE 10 MG: 10 CAPSULE ORAL at 22:01

## 2021-05-01 RX ADMIN — CARVEDILOL 12.5 MG: 12.5 TABLET, FILM COATED ORAL at 07:57

## 2021-05-01 ASSESSMENT — ACTIVITIES OF DAILY LIVING (ADL)
ADLS_ACUITY_SCORE: 18

## 2021-05-01 ASSESSMENT — VISUAL ACUITY
OU: BLURRED VISION
OU: BLURRED VISION

## 2021-05-01 NOTE — PROGRESS NOTES
Care Management Follow Up    Length of Stay (days): 7    Expected Discharge Date: 05/01/21     Concerns to be Addressed: all concerns addressed in this encounter, grief and loss, adjustment to diagnosis/illness, home safety     Patient plan of care discussed at interdisciplinary rounds: No    Anticipated Discharge Disposition: Short Term Rehab     Anticipated Discharge Services:  [address] [place] [address]  F:[phone]  P: [phone]  Anticipated Discharge DME:  N/A      Education Provided on the Discharge Plan:  yes  Patient/Family in Agreement with the Plan:  Spoke with Wife Tracey. She requests call before 9am at [phone] on Sunday, 5/2 to confirm that pt is ready for discharge.    Private pay costs discussed: Not applicable    Additional Information:  JOSE spoke w/Beds[facility] JOHANA Stone. Pt is not medically stable for discharge today; anticipate pt will be ready for Discharge on Sunday, 5/2 provided pt leaves hospital around 10:30/11am.    Updated pt's wife. She will let her son know about discharge plans.      Kaylah Huang, MONIK, MSW      Saturday     Text paging available through [employer] on [employer] - search SOCIAL WORK     ON CALL PAGER [phone]  ON CALL COVERAGE AFTER 1600 [phone]

## 2021-05-01 NOTE — PLAN OF CARE
Admitted for ICH d/t HTN. VSS ex HTN at start of shift, was within parameters as night progressed. Neuros: [place], intermittent blurred vision. Tylenol for HA. PIV SL. Regular diet. Garcia for retention. Up SBA. BM yesterday. Plan possible discharge today home. Continue to monitor.

## 2021-05-01 NOTE — PLAN OF CARE
Status: Patient admitted on 4/24 with intracerebral hemorrhage  Vitals: Hypertensive this morning relieved with morning PO BP medications Losartan discontinued, Lisinopril started today, hypertensive this afternoon, resolved without any PRN Hydralazine    Neuros: A&Ox4, blurred vision, nystagmus, [place]  IV: PIV saline locked  Labs/Electrolytes: WNL  Resp/trach: Lung sounds clear throughout  Diet: Regular, fair PO intake  Bowel status: BM yesterday, BS+  : Garcia catheter in place for retention  Skin: R groin site with tegaderm CDI, bruising present, Generalized bruising throughout  Pain: Denies  Activity: Up with A1, walker, gaitbelt  Social: Calm and cooperative with cares, call wife prior to 0900 tomorrow to confirm discharge in AM  Plan: Discharge to [facility] tomorrow at 1100 if BP remains under control with PO medications

## 2021-05-01 NOTE — PROGRESS NOTES
[facility]    Stroke Progress Note    Interval Events  - Tylenol for headache  -  this AM  - discharge canceled    Impression  Intracerebral Hemorrhage   Mr Staci is an 84yo gentleman admitted for management of cerebellar ICH. Bleed day 4/24. Persistent hypertension despite escalating medication doses. Will cancel discharge for today and re-evaluate antihypertensive plan.    Plan  #cerebellar ICH, ICH score 2  #subarachnoid hemorrhage, nonaneurysmal  - SBP < 180  - neurochecks q4h  - repeat MRI in 3-6 months    #hypertension  - amlodipine 10mg  - carvedilol 12.5mg BID  - stop losartan  - start lisinopril 10mg BID (BID dosing for more consistent effect)  - goal BP < 140/90 long term, in house cap < 180    #constipation, typically has 2-3 BM per week chronically  - scheduled Miralax/senna-doc  - Milk of Mg PRN    #BPH  - terazosin  - finasteride  - Garcia inserted by urology, needs to stay in for 7 days    #DM2  - glimepiride  - metformin  - High sliding scale insulin  - hypoglycemia protocol  - goal euglycemia    #GERD  - omeprazole    #DVT PPX  - subcutaneous heparin    #CODE STATUS: FULL CODE    #Dispo: 6A pending better BP control, then TCU    The Stroke/Neurocritical Care Staff is Dr. Francisco.    Lazarus Higginbotham DO  Neurocritical Care Fellow  ______________________________________________________    Medications   Home Meds  Prior to Admission medications    Medication Sig Start Date End Date Taking? Authorizing Provider   albuterol (PROAIR HFA/PROVENTIL HFA/VENTOLIN HFA) 108 (90 Base) MCG/ACT inhaler Inhale 2 puffs into the lungs every 4 hours as needed 3/23/21  Yes Reported, Patient   amLODIPine (NORVASC) 5 MG tablet Take 5 mg by mouth daily In evening 12/16/20  Yes Reported, Patient   azelastine (ASTELIN) 0.1 % nasal spray Spray 1 spray in nostril 2 times daily 4/22/21 4/22/22 Yes Reported, Patient   finasteride (PROSCAR) 5 MG tablet Take 5 mg by mouth daily  12/16/20  Yes Reported, Patient   losartan (COZAAR) 25 MG tablet Take 25 mg by mouth 2 times daily 12/16/20  Yes Reported, Patient   metFORMIN (GLUCOPHAGE) 1000 MG tablet Take 1,000 mg by mouth 2 times daily (with meals) 12/16/20  Yes Reported, Patient   omeprazole (PRILOSEC) 40 MG DR capsule Take 40 mg by mouth daily 12/16/20  Yes Reported, Patient   pantoprazole (PROTONIX) 40 MG EC tablet Take 40 mg by mouth daily 4/12/21 4/12/22 Yes Reported, Patient   pravastatin (PRAVACHOL) 20 MG tablet Take 20 mg by mouth daily 4/12/21 4/12/22 Yes Reported, Patient   terazosin (HYTRIN) 10 MG capsule Take 10 mg by mouth At Bedtime 12/16/20  Yes Reported, Patient   aspirin (ASA) 81 MG EC tablet Take 81 mg by mouth daily    Reported, Patient   cholecalciferol 25 MCG (1000 UT) TABS Take 1,000 Units by mouth daily    Reported, Patient   glimepiride (AMARYL) 1 MG tablet Take 1 mg by mouth 2 times daily (with meals)    Reported, Patient   ibuprofen (ADVIL/MOTRIN) 200 MG tablet Take 400 mg by mouth every 4 hours as needed    Reported, Patient   Multiple Vitamin (THERA-TABS) TABS Take 1 tablet by mouth daily    Reported, Patient   polyethylene glycol (MIRALAX) 17 GM/Dose powder Take 17 g by mouth daily    Reported, Patient   triamcinolone (NASACORT ALLERGY 24HR) 55 MCG/ACT nasal aerosol Spray 1 spray in nostril daily    Reported, Patient   vitamin C (ASCORBIC ACID) 500 MG tablet Take 500 mg by mouth daily    Reported, Patient       Scheduled Meds    amLODIPine  10 mg Oral or Feeding Tube Daily     bisacodyl  10 mg Rectal Once     carvedilol  12.5 mg Oral BID w/meals     finasteride  5 mg Oral or Feeding Tube Daily     glimepiride  1 mg Oral BID w/meals     heparin ANTICOAGULANT  5,000 Units Subcutaneous Q8H     insulin aspart  1-10 Units Subcutaneous TID AC     insulin aspart  1-7 Units Subcutaneous At Bedtime     losartan  50 mg Oral or Feeding Tube BID     metFORMIN  1,000 mg Oral BID w/meals     omeprazole  40 mg Oral Daily      polyethylene glycol  17 g Oral or Feeding Tube Daily     pravastatin  20 mg Oral Daily at 8 pm     senna-docusate  2 tablet Oral or Feeding Tube BID     terazosin  10 mg Oral At Bedtime     vitamin C  500 mg Oral Daily     Vitamin D3  1,000 Units Oral Daily       Infusion Meds      PRN Meds  acetaminophen, sore throat lozenge, glucose **OR** dextrose **OR** glucagon, hydrALAZINE, magnesium hydroxide, melatonin       PHYSICAL EXAMINATION  Temp:  [96.8  F (36  C)-98.3  F (36.8  C)] 96.8  F (36  C)  Pulse:  [55-92] 92  Resp:  [16-17] 17  BP: (153-219)/(63-92) 189/91  SpO2:  [92 %-97 %] 92 %     General:  sleeping comfortably, wakes easily    HEENT:  normocephalic/atraumatic  Cardio:  RRR  Pulmonary:  no respiratory distress  Abdomen:  soft, bowel sounds present  Extremities:  no edema  Skin:  intact     Neurologic  Mental Status:  alert, oriented x 3, follows commands, speech clear and fluent, naming and repetition normal  Cranial Nerves:  visual fields intact, PERRL, EOMI with normal smooth pursuit, facial movements symmetric, hearing not formally tested but intact to conversation, no dysarthria, tongue protrusion midline  Motor:  normal muscle tone and bulk, no abnormal movements, able to move all limbs spontaneously, strength 5/5 throughout upper and lower extremities, no pronator drift  Reflexes:  not assessed  Sensory:  light touch sensation intact and symmetric throughout upper and lower extremities, no extinction on double simultaneous stimulation   Coordination:  equivocal ataxia with FNF on left  Station/Gait:  deferred     Stroke Scales    ICH Score (at arrival)  Scoring Tool Score   Age ? 80 years 1 point Yes   GCS score  3-4   5-12   13-15   2 point  1 point  0 point GCS 13-15   Hematoma volume, cm 3 ? 30 1 point No   Intraventricular extension 1 point No   Infratentorial location 1 point Yes   Total 2       Imaging  I personally reviewed all imaging; relevant findings per HPI.     Lab Results Data    CBC  Recent Labs   Lab 04/30/21  0713 04/29/21  0454 04/28/21  0440   WBC 7.7 8.9 8.8   RBC 4.08* 4.06* 4.15*   HGB 12.4* 12.0* 12.0*   HCT 36.4* 35.4* 36.7*    248 227     Basic Metabolic Panel    Recent Labs   Lab 04/30/21  0713 04/29/21  1020 04/29/21  0454 04/28/21  0440     --  130* 132*   POTASSIUM 3.8 3.8 3.4 3.9   CHLORIDE 100  --  97 102   CO2 27  --  26 25   BUN 20  --  21 18   CR 0.88  --  0.92 0.99   *  --  175* 166*   PRABHU 8.7  --  8.7 8.3*     Liver Panel  No results for input(s): PROTTOTAL, ALBUMIN, BILITOTAL, ALKPHOS, AST, ALT, BILIDIRECT in the last 168 hours.  INR    Recent Labs   Lab Test 04/24/21 2237   INR 1.03      Lipid Profile    Recent Labs   Lab Test 04/26/21  0407   LDL 58     A1C    Recent Labs   Lab Test 04/27/21  0737 04/24/21  2237   A1C 7.4* 7.3*     Troponin I  No results for input(s): TROPI in the last 168 hours.

## 2021-05-01 NOTE — PLAN OF CARE
Shift [id number]     Status: Patient admitted d/t cerebral hemorrhage on 4/24, here to monitor high BP  Vitals: Hypertensive, scheduled BP meds and hourly PRN PO hydralazine given with little effect, MD aware  Neuros: A&Ox4, PERRLA, [place], blurred vision  IV: PIV SL  Labs/Electrolytes: WNL  Resp/trach: Clear lung sounds, O2 96% on RA  Diet: Regular diet, good intake this shift. Has Novolog sliding scale, 1 unit given for BG of 163  Bowel status: Audible bowel sounds, last BM 4/30  : Garcia catheter with good output, placed by urology  Skin: Generalized bruising  Pain: Patient denied pain  Activity: Up with SBA and GB  Plan: Scheduled to discharge tomorrow AM, patients son is planning to pick him up. Patient/wife expressed concern for discharge tomorrow as BP's are still high

## 2021-05-02 ENCOUNTER — APPOINTMENT (OUTPATIENT)
Dept: OCCUPATIONAL THERAPY | Facility: CLINIC | Age: 86
DRG: 064 | End: 2021-05-02
Attending: PSYCHIATRY & NEUROLOGY
Payer: MEDICARE

## 2021-05-02 VITALS
OXYGEN SATURATION: 95 % | HEART RATE: 72 BPM | DIASTOLIC BLOOD PRESSURE: 72 MMHG | WEIGHT: 164.24 LBS | RESPIRATION RATE: 16 BRPM | HEIGHT: 68 IN | SYSTOLIC BLOOD PRESSURE: 166 MMHG | TEMPERATURE: 97 F | BODY MASS INDEX: 24.89 KG/M2

## 2021-05-02 LAB
ANION GAP SERPL CALCULATED.3IONS-SCNC: 7 MMOL/L (ref 3–14)
BUN SERPL-MCNC: 20 MG/DL (ref 7–30)
CALCIUM SERPL-MCNC: 8.5 MG/DL (ref 8.5–10.1)
CHLORIDE SERPL-SCNC: 99 MMOL/L (ref 94–109)
CO2 SERPL-SCNC: 27 MMOL/L (ref 20–32)
CREAT SERPL-MCNC: 0.89 MG/DL (ref 0.66–1.25)
ERYTHROCYTE [DISTWIDTH] IN BLOOD BY AUTOMATED COUNT: 13.4 % (ref 10–15)
GFR SERPL CREATININE-BSD FRML MDRD: 78 ML/MIN/{1.73_M2}
GLUCOSE BLDC GLUCOMTR-MCNC: 229 MG/DL (ref 70–99)
GLUCOSE SERPL-MCNC: 149 MG/DL (ref 70–99)
HCT VFR BLD AUTO: 33.9 % (ref 40–53)
HGB BLD-MCNC: 11.3 G/DL (ref 13.3–17.7)
LABORATORY COMMENT REPORT: NORMAL
MCH RBC QN AUTO: 30.1 PG (ref 26.5–33)
MCHC RBC AUTO-ENTMCNC: 33.3 G/DL (ref 31.5–36.5)
MCV RBC AUTO: 90 FL (ref 78–100)
PLATELET # BLD AUTO: 268 10E9/L (ref 150–450)
POTASSIUM SERPL-SCNC: 3.7 MMOL/L (ref 3.4–5.3)
RBC # BLD AUTO: 3.76 10E12/L (ref 4.4–5.9)
SARS-COV-2 RNA RESP QL NAA+PROBE: NEGATIVE
SODIUM SERPL-SCNC: 132 MMOL/L (ref 133–144)
SPECIMEN SOURCE: NORMAL
WBC # BLD AUTO: 7.2 10E9/L (ref 4–11)

## 2021-05-02 PROCEDURE — 250N000013 HC RX MED GY IP 250 OP 250 PS 637: Performed by: STUDENT IN AN ORGANIZED HEALTH CARE EDUCATION/TRAINING PROGRAM

## 2021-05-02 PROCEDURE — 85027 COMPLETE CBC AUTOMATED: CPT | Performed by: STUDENT IN AN ORGANIZED HEALTH CARE EDUCATION/TRAINING PROGRAM

## 2021-05-02 PROCEDURE — 80048 BASIC METABOLIC PNL TOTAL CA: CPT | Performed by: STUDENT IN AN ORGANIZED HEALTH CARE EDUCATION/TRAINING PROGRAM

## 2021-05-02 PROCEDURE — U0005 INFEC AGEN DETEC AMPLI PROBE: HCPCS | Performed by: STUDENT IN AN ORGANIZED HEALTH CARE EDUCATION/TRAINING PROGRAM

## 2021-05-02 PROCEDURE — 250N000013 HC RX MED GY IP 250 OP 250 PS 637: Performed by: NURSE PRACTITIONER

## 2021-05-02 PROCEDURE — 36415 COLL VENOUS BLD VENIPUNCTURE: CPT | Performed by: STUDENT IN AN ORGANIZED HEALTH CARE EDUCATION/TRAINING PROGRAM

## 2021-05-02 PROCEDURE — 97530 THERAPEUTIC ACTIVITIES: CPT | Mod: GO

## 2021-05-02 PROCEDURE — U0003 INFECTIOUS AGENT DETECTION BY NUCLEIC ACID (DNA OR RNA); SEVERE ACUTE RESPIRATORY SYNDROME CORONAVIRUS 2 (SARS-COV-2) (CORONAVIRUS DISEASE [COVID-19]), AMPLIFIED PROBE TECHNIQUE, MAKING USE OF HIGH THROUGHPUT TECHNOLOGIES AS DESCRIBED BY CMS-2020-01-R: HCPCS | Performed by: STUDENT IN AN ORGANIZED HEALTH CARE EDUCATION/TRAINING PROGRAM

## 2021-05-02 PROCEDURE — 250N000011 HC RX IP 250 OP 636: Performed by: STUDENT IN AN ORGANIZED HEALTH CARE EDUCATION/TRAINING PROGRAM

## 2021-05-02 PROCEDURE — 999N001017 HC STATISTIC GLUCOSE BY METER IP

## 2021-05-02 PROCEDURE — 97535 SELF CARE MNGMENT TRAINING: CPT | Mod: GO

## 2021-05-02 PROCEDURE — 99239 HOSP IP/OBS DSCHRG MGMT >30: CPT | Mod: GC | Performed by: PSYCHIATRY & NEUROLOGY

## 2021-05-02 PROCEDURE — 250N000013 HC RX MED GY IP 250 OP 250 PS 637: Performed by: PSYCHIATRY & NEUROLOGY

## 2021-05-02 RX ORDER — OMEPRAZOLE 40 MG/1
40 CAPSULE, DELAYED RELEASE ORAL DAILY
Start: 2021-05-02

## 2021-05-02 RX ORDER — CARVEDILOL 12.5 MG/1
12.5 TABLET ORAL 2 TIMES DAILY WITH MEALS
Start: 2021-05-02

## 2021-05-02 RX ORDER — TRIAMCINOLONE ACETONIDE 55 UG/1
1 SPRAY, METERED NASAL DAILY
Start: 2021-05-02

## 2021-05-02 RX ORDER — FINASTERIDE 5 MG/1
5 TABLET, FILM COATED ORAL DAILY
Start: 2021-05-02

## 2021-05-02 RX ORDER — PRAVASTATIN SODIUM 20 MG
20 TABLET ORAL DAILY
Start: 2021-05-02

## 2021-05-02 RX ORDER — LISINOPRIL 10 MG/1
10 TABLET ORAL 2 TIMES DAILY
Start: 2021-05-02

## 2021-05-02 RX ORDER — MULTIVITAMIN,THERAPEUTIC
1 TABLET ORAL DAILY
Start: 2021-05-02

## 2021-05-02 RX ORDER — AZELASTINE 1 MG/ML
1 SPRAY, METERED NASAL 2 TIMES DAILY
Start: 2021-05-02

## 2021-05-02 RX ORDER — ALBUTEROL SULFATE 90 UG/1
2 AEROSOL, METERED RESPIRATORY (INHALATION) EVERY 4 HOURS PRN
Start: 2021-05-02

## 2021-05-02 RX ORDER — GLIMEPIRIDE 1 MG/1
1 TABLET ORAL 2 TIMES DAILY WITH MEALS
Start: 2021-05-02

## 2021-05-02 RX ORDER — ASCORBIC ACID 500 MG
500 TABLET ORAL DAILY
Start: 2021-05-02

## 2021-05-02 RX ORDER — TERAZOSIN 10 MG/1
10 CAPSULE ORAL AT BEDTIME
Start: 2021-05-02

## 2021-05-02 RX ORDER — AMOXICILLIN 250 MG
2 CAPSULE ORAL 2 TIMES DAILY
Refills: 0
Start: 2021-05-02

## 2021-05-02 RX ORDER — AMLODIPINE BESYLATE 10 MG/1
10 TABLET ORAL DAILY
Start: 2021-05-02

## 2021-05-02 RX ORDER — IBUPROFEN 200 MG
400 TABLET ORAL EVERY 4 HOURS PRN
Start: 2021-05-02

## 2021-05-02 RX ORDER — POLYETHYLENE GLYCOL 3350 17 G/17G
17 POWDER, FOR SOLUTION ORAL DAILY
Start: 2021-05-02

## 2021-05-02 RX ADMIN — GLIMEPIRIDE 1 MG: 1 TABLET ORAL at 07:54

## 2021-05-02 RX ADMIN — POLYETHYLENE GLYCOL 3350 17 G: 17 POWDER, FOR SOLUTION ORAL at 07:54

## 2021-05-02 RX ADMIN — OXYCODONE HYDROCHLORIDE AND ACETAMINOPHEN 500 MG: 500 TABLET ORAL at 07:56

## 2021-05-02 RX ADMIN — CARVEDILOL 12.5 MG: 12.5 TABLET, FILM COATED ORAL at 07:56

## 2021-05-02 RX ADMIN — FINASTERIDE 5 MG: 5 TABLET, FILM COATED ORAL at 07:56

## 2021-05-02 RX ADMIN — Medication 1000 UNITS: at 07:55

## 2021-05-02 RX ADMIN — LISINOPRIL 10 MG: 10 TABLET ORAL at 07:55

## 2021-05-02 RX ADMIN — METFORMIN HYDROCHLORIDE 1000 MG: 500 TABLET ORAL at 07:55

## 2021-05-02 RX ADMIN — OMEPRAZOLE 40 MG: 20 CAPSULE, DELAYED RELEASE ORAL at 07:55

## 2021-05-02 RX ADMIN — DOCUSATE SODIUM 50 MG AND SENNOSIDES 8.6 MG 2 TABLET: 8.6; 5 TABLET, FILM COATED ORAL at 07:54

## 2021-05-02 RX ADMIN — HEPARIN SODIUM 5000 UNITS: 5000 INJECTION, SOLUTION INTRAVENOUS; SUBCUTANEOUS at 03:24

## 2021-05-02 RX ADMIN — AMLODIPINE BESYLATE 10 MG: 10 TABLET ORAL at 07:56

## 2021-05-02 ASSESSMENT — VISUAL ACUITY
OU: BLURRED VISION
OU: BLURRED VISION

## 2021-05-02 ASSESSMENT — PATIENT HEALTH QUESTIONNAIRE - PHQ9: SUM OF ALL RESPONSES TO PHQ QUESTIONS 1-9: 0

## 2021-05-02 ASSESSMENT — ACTIVITIES OF DAILY LIVING (ADL)
ADLS_ACUITY_SCORE: 18

## 2021-05-02 NOTE — PLAN OF CARE
Occupational Therapy Discharge Summary    Reason for therapy discharge:    Discharged to transitional care facility.    Progress towards therapy goal(s). See goals on Care Plan in [facility] electronic health record for goal details.  Goals partially met.  Barriers to achieving goals:   discharge from facility.    Therapy recommendation(s):    Continued therapy is recommended.  Rationale/Recommendations:  to increase activity tolerance and independence with ADL completion.

## 2021-05-02 NOTE — PROGRESS NOTES
Status: Patient admitted on 4/24 with intracerebral hemorrhage  Vitals: VSS, Hypertension w/in parameters  Neuros: A&Ox4.  Blurred vison. [place]  IV: PIV removed  Resp/trach: WDL  Diet: regular diet  Bowel status: LBM 5/1, BS+  : Garcia catheter in place for retention  Skin: groin site CDI, bruising present, Generalized bruising throughout  Pain: denies  Activity: A1, GB, walker  Plan: Discharge today to TCU  Updates this shift: Patient is discharging to [facility] today, at 1040.  Family is picking him up and providing transportation there.

## 2021-05-02 NOTE — PLAN OF CARE
Admitted for ICH d/t HTN. VSS ex HTN within parameters. Neuros: "[facility] [facility] [employer] intermittent blurred vision improving. Tylenol for generalized pain. PIV SL. Regular diet. Garcia for retention. Up SBA. Plan discharge to short term rehab today by 11. Continue to monitor.

## 2021-05-02 NOTE — PROGRESS NOTES
Care Management Discharge Note    Discharge Date: 05/02/21at 10:45am    Dis[facility] Dis[facility]CU  [facility]  [address] [place] [address]  F:[phone]  P: [phone]    **FOR RN TO RN REPORT, call [phone]**    Discharge Services:  TCU     Discharge DME:   None    Discharge Transportation: car, drives self    Private pay costs discussed: Not applicable    PAS Confirmation Code:  [id number]  Patient/family educated on Medicare website which has current facility and service quality ratings: yes    Education Provided on the Discharge Plan:   Yes  Persons Notified of Discharge Plans: Attending Provider, charge RN, bedside RN (who will inform pt), pt's wife Tracey on the phone, pt's son Negrito on the phone, and Bhargavi (RN) at [facility].   Patient/Family in Agreement with the Plan:  Yes    Handoff Referral Completed: Yes    Additional Information:  JOSE informed by MD that pt is ready to discharge. JOSE spoke with Bhargavi at [facility] who states they can accept pt today if he leaves the hospital by 11am. JOSE received orders and faxed orders/discharge summary at 10:00am. JOSE updated charge RN and bedside RN, provided them with RN to RN report, requested they call that in before pt discharges. JOSE updated spouse on the phone (Tracey), who confirms her son Negrito is on his way to the hospital. JOSE spoke with son Negrito, confirmed he can pull into ED parking lot and RN will bring pt down at 10:45. JOSE provided Negrito with RN's phone number and vice versa.     No other SW needs anticipated at this time.             Kay Basurto, [facility]  Weekend Adult Acute Care     For weekend social work needs, contact information below and available on [employer]:  4A, 4C, 4E, 5A, 5B                  pager [phone]  6A, 6B, 6C                               pager [phone]  7A, 7B, 7C, 7D, 5C                 pager [phone]  ED/OBS                                  pager [phone]      For weekend RN care coordinator needs (home discharge with needs including home care, rides home, assisted living facility returns, durable medical equip, IV antibiotics) - page [phone].     For hours 1600 - midnight Pager: [phone]

## 2021-05-02 NOTE — PLAN OF CARE
Physical Therapy Discharge Summary    Reason for therapy discharge:    Discharged to transitional care facility.    Progress towards therapy goal(s). See goals on Care Plan in [facility] electronic health record for goal details.  Goals partially met.  Barriers to achieving goals:   discharge from facility.    Therapy recommendation(s):    Continued therapy is recommended.  Rationale/Recommendations:  continued PT to progress IND with gait and balance. Eulalia 21/56 in acute setting.

## 2021-05-03 ENCOUNTER — TELEPHONE (OUTPATIENT)
Dept: NEUROLOGY | Facility: CLINIC | Age: 86
End: 2021-05-03

## 2021-05-19 ENCOUNTER — CARE COORDINATION (OUTPATIENT)
Dept: NEUROLOGY | Facility: CLINIC | Age: 86
End: 2021-05-19

## 2021-05-19 NOTE — PROGRESS NOTES
CT and MRI orders refaxed to [facility]. Marked ATTN: Rachele fax: [phone] as requested in call today.     Nelida VAUGHN

## 2021-05-19 NOTE — TELEPHONE ENCOUNTER
[facility] Call Center    Phone Message    May a detailed message be left on voicemail: yes     Reason for Call: Other: Rachele calling from  [facility] in regards to receiving orders for patient. States that they didn't receive the orders. States that patient is wanting to do MRI and Neurology consult at [facility] in [place]. Wanting consult referral and MRI orders faxed to them and Rachele states she will send in right direction.     Fax: [phone]    Please advise and call Rachele back with any questions or concerns     Action Taken: Other: [facility] NEUROLOGY    Travel Screening: Not Applicable

## 2022-05-05 NOTE — PROGRESS NOTES
Medications verified, no changes.  Allergies verified, no changes.   Tobacco use verified, no changes.   PCP verified, no changes.     Fu visit.  Non diabetic pt here for a toenail trim.  Vitals deferred by MD    Past Medical History:   Diagnosis Date   • Anemia    • Aortic valve sclerosis 01/19/2017    Very brief, faint murmur with some radiation to the sternal notch.   • Arthritis    • Blood transfusion, without reported diagnosis '90    Blood Transfusion recipient in past   • Cancer of skin 08/02/2021 08/02/2021   Basal Cell    Right prelobule/ lateral mandible / Mohs & repair Dr. 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   • Diverticulosis 08/10/2020    per colonoscopy   • Diverticulosis of colon (without mention of hemorrhage)     Diverticulosis - sigmoid   • Essential hypertension 2/28/2002   • Hemorrhoids 08/10/2020    per colonoscopy   • Mixed hyperlipidemia     Hyperlipidemia   • Osteoporosis     osteopenia   • Other chronic cystitis     UTI (recurrent)   • Other malignant neoplasm of skin, site unspecified 5/9/00    Skin Cancer - nose   • SJOGREN'S SYNDROME    • Urinary tract infection           [facility]  NeuroCritical Care Progress Note    Patient Name:  Ronen Small  MRN:  [id number]    :  1935  Date of Service:  2021  Primary care provider:  Adi Mariano        24 HOUR EVENTS:  Pt doing well. Will transfer today. Garcia inserted overnight for retention. hydrochlorothiazide stopped, coreg added.        ASSESSMENT/PLAN:  Ronen Small is a 85 year old admitted on 2021 with acute cerebellar hemorrhage. SBPs at OSH > 200's. Requiring Nicardipine gtt. CTA with 3 mm vascular outpouching medial to distal left PCA, possibly a venous structure.         NEURO:  #Acute cerebellar hemorrhage ICH 2  #Cerebral Edema with Brain Compression  -No aneurysm seen on angio  -sbp <160  -HOB>30  -Hold PTA ASA x 2 weeks. CT head before restarting  -PT/OT  -MRI brain completed      CARDIO:  #HTN  -SBP < 160  -PRN's  -PTA norvasc increased to 10mg daily  -PTA coreg to 50mg BID  -discontinue hydrochlorothiazide   -start coreg 6.25 BID        PULM:  #KAPIL  -room air  -O2>92        GI:  #GERD  - Bowel regimen: senna miralax, MOM PRN  -Diet ordered  - Last BM:   - GI ppx: Protonix 40mg        RENAL:  #Nocturia  -PTA finasteride 5mg daily  -mag ox 400mg PO  -NAK 1 packet BID  -Daily BMP  -electrolyte replacement        ENDO:  #DMII  #Hyperglycemia  -high dose SS  - Follow glucose levels     HEME:  #ERWIN  - Daily CBC        ID:  #ERWIN  -Daily CBC        Checklist:  FEN: none, replacement, regular  LDA: PIV  PPx:   - DVT: SCD's, heparin SQ  - GI: protonix  Code: full     Dispo: ICU        Patient discussed with Attending Dr. Francisco .    Tani Orta DNP  Ascom: [id number]  2021      ______________________________    24 HOUR VITAL SIGNS SUMMARY:   Temperatures:  Current - Temp: 96.3  F (35.7  C); Max - Temp  Av.9  F (36.6  C)  Min: 96.3  F (35.7  C)  Max: 98.5  F (36.9  C)  Respiration range: Resp  Av.8  Min: 7  Max: 39  Pulse range: Pulse  Av.7   "Min: 58  Max: 86  Blood pressure range: Systolic (24hrs), Avg:160 , Min:101 , Max:202   ; Diastolic (24hrs), Av, Min:48, Max:105    Pulse oximetry range: SpO2  Av %  Min: 93 %  Max: 99 %    VENTILATOR SETTINGS:  Resp: 16        Intake/Output Summary (Last 24 hours) at 2021 1356  Last data filed at 2021 1343  Gross per 24 hour   Intake 1200 ml   Output 2215 ml   Net -1015 ml       BP - Mean:  [] 103    PHYSICAL EXAMINATION:   BP (!) 157/80 (BP Location: Right arm)   Pulse 64   Temp 96.3  F (35.7  C) (Oral)   Resp 16   Ht 1.727 m (5' 8\")   Wt 74.5 kg (164 lb 3.9 oz)   SpO2 96%   BMI 24.97 kg/m      General: Awake and alert, Lying in bed, NAD, cooperative  HEENT: Normocephalic, atraumatic, no epistaxis, no oral lesions, MMM  Resp: CTAB, no increased work of breathing  Cardio: RRR  Abdomen: +BS, Soft, non-distended, non-tender  Extremities: Warm and well perfused, peripheral pulses present, no edema  Skin: Not jaundiced, no rash, no ecchymoses  Psych: Normal mood and affect     Neuro:  Mental status: Awake, alert, attentive; oriented to P/P/T/S  Speech: Fluent, comprehension and repetition intact; No dysarthria  Cranial nerves: Visual fields intact, Eyes conjugate, PERRLA, EOMI w/ normal and smooth pursuit, face expression symmetric, facial sensation intact and symmetric, hearing intact to conversation, shoulder shrug strong, palate rise symmetric, tongue/uvula midline.  Motor: Tone normal. 5/5 strength in x4E. No atrophy or twitches. No Pronator drift present. Finger tapping symmetric. Rolling hands normal rate and coordination  Sensory: Intact to LT, PP x4E; No lateral extinction   Coordination: Ataxia, impaired gait.   Gait: impaired gait. Needed walker with assistance.       CURRENT MEDICATIONS:    amLODIPine  10 mg Oral or Feeding Tube Daily     bisacodyl  10 mg Rectal Once     carvedilol  6.25 mg Oral BID w/meals     finasteride  5 mg Oral or Feeding Tube Daily     heparin " ANTICOAGULANT  5,000 Units Subcutaneous Q8H     insulin aspart  1-10 Units Subcutaneous TID AC     insulin aspart  1-7 Units Subcutaneous At Bedtime     losartan  50 mg Oral or Feeding Tube BID     pantoprazole  40 mg Oral QAM AC     polyethylene glycol  17 g Oral or Feeding Tube Daily     pravastatin  20 mg Oral Daily at 8 pm     senna-docusate  2 tablet Oral or Feeding Tube BID       PRN MEDICATIONS:  acetaminophen, glucose **OR** dextrose **OR** glucagon, hydrALAZINE, labetalol, magnesium hydroxide, melatonin    INFUSIONS:      ALLERGIES:  Allergies   Allergen Reactions     Atorvastatin Other (See Comments)     Myalgias     Fluoxetine Other (See Comments)     Hypersomnolence     Indomethacin Other (See Comments)     Hypersomnolence         LABS/STUDIES:  Recent Labs   Lab Test 04/29/21  1020 04/29/21  0454 04/28/21  0440 04/27/21  0737   NA  --  130* 132* 130*   POTASSIUM 3.8 3.4 3.9 3.4   CHLORIDE  --  97 102 100   CO2  --  26 25 24   ANIONGAP  --  7 5 6   GLC  --  175* 166* 175*   BUN  --  21 18 17   CR  --  0.92 0.99 0.85   PRABHU  --  8.7 8.3* 8.3*   WBC  --  8.9 8.8 10.7   RBC  --  4.06* 4.15* 4.11*   HGB  --  12.0* 12.0* 12.4*   PLT  --  248 227 228       Recent Labs   Lab Test 04/24/21  2237   INR 1.03       No lab results found in last 7 days.      I have personally reviewed all labs and imaging for this patient.

## 2023-07-18 NOTE — PROGRESS NOTES
SPIRITUAL HEALTH SERVICES  SPIRITUAL ASSESSMENT Progress Note  [facility]A     REFERRAL SOURCE: Pt request for communion.    I celebrated communion with Ronen and his wife Tracey.     PLAN: Will refer to unit  for ongoing spiritual assessment and support.      Nitza Huang  Chaplain Resident  Pager: [phone]     Opt out

## 2024-12-10 NOTE — TELEPHONE ENCOUNTER
Health Call Center    Phone Message    May a detailed message be left on voicemail: yes     Reason for Call: Order(s): Other:   Reason for requested:   CT Head w/o contrast  MRI Brain w & w/o contrast  Neurology Adult Referral  Date needed: ASAP  Provider name: Dr. Narendra Jeffrey at [facility], these orders ore to be placed with Prasad at fax [phone]. They also request all notes to accompany requests to define time requested.    Please call Rachele back with questions.      Action Taken: Message routed to:  [facility] Neurology    Travel Screening: Not Applicable                                                                         individual instruction

## (undated) RX ORDER — LIDOCAINE HYDROCHLORIDE 10 MG/ML
INJECTION, SOLUTION EPIDURAL; INFILTRATION; INTRACAUDAL; PERINEURAL
Status: DISPENSED
Start: 2021-04-26

## (undated) RX ORDER — HEPARIN SODIUM 1000 [USP'U]/ML
INJECTION, SOLUTION INTRAVENOUS; SUBCUTANEOUS
Status: DISPENSED
Start: 2021-04-26

## (undated) RX ORDER — FENTANYL CITRATE 50 UG/ML
INJECTION, SOLUTION INTRAMUSCULAR; INTRAVENOUS
Status: DISPENSED
Start: 2021-04-26